# Patient Record
Sex: FEMALE | Race: WHITE
[De-identification: names, ages, dates, MRNs, and addresses within clinical notes are randomized per-mention and may not be internally consistent; named-entity substitution may affect disease eponyms.]

---

## 2017-08-08 ENCOUNTER — HOSPITAL ENCOUNTER (EMERGENCY)
Dept: HOSPITAL 41 - JD.ED | Age: 82
Discharge: HOME | End: 2017-08-08
Payer: MEDICARE

## 2017-08-08 VITALS — SYSTOLIC BLOOD PRESSURE: 153 MMHG | DIASTOLIC BLOOD PRESSURE: 59 MMHG

## 2017-08-08 DIAGNOSIS — Z95.1: ICD-10-CM

## 2017-08-08 DIAGNOSIS — K21.9: ICD-10-CM

## 2017-08-08 DIAGNOSIS — Z79.899: ICD-10-CM

## 2017-08-08 DIAGNOSIS — Z88.6: ICD-10-CM

## 2017-08-08 DIAGNOSIS — E66.9: ICD-10-CM

## 2017-08-08 DIAGNOSIS — Z79.02: ICD-10-CM

## 2017-08-08 DIAGNOSIS — I25.2: Primary | ICD-10-CM

## 2017-08-08 DIAGNOSIS — Z79.4: ICD-10-CM

## 2017-08-08 DIAGNOSIS — E78.00: ICD-10-CM

## 2017-08-08 DIAGNOSIS — E11.9: ICD-10-CM

## 2017-08-08 DIAGNOSIS — M81.0: ICD-10-CM

## 2017-08-08 DIAGNOSIS — M19.90: ICD-10-CM

## 2017-08-08 DIAGNOSIS — F32.9: ICD-10-CM

## 2017-08-08 NOTE — EDM.PDOC
ED HPI GENERAL MEDICAL PROBLEM





- General


Chief Complaint: Cardiovascular Problem


Stated Complaint: HIGH BP


Time Seen by Provider: 08/08/17 13:12


Source of Information: Reports: Patient


History Limitations: Reports: No Limitations





- History of Present Illness


INITIAL COMMENTS - FREE TEXT/NARRATIVE: 





88-year-old female [place] due to discovery of marked 

elevation of blood pressure. Patient reports that her blood pressure was 

checked 3 times in a row on the same arm and each time it seemed to go higher. 

Apparently the systolic pressure was greater than 210 and the diastolic was 

greater than 112.  feels fine without a headache visual acuity changes or 

nausea vomiting etc. She is in no pain. Initial blood pressure here is 153/59.


Onset: Today


Onset Date: 08/08/17


Onset Time: 11:00


Duration: Minutes:, Resolved Prior to Arrival


Severity: Moderate


Improves with: Reports: None


Worsens with: Reports: None (Has chronic hypertension.)


Context: Denies: Activity, Exercise, Lifting, Sick Contact, Trauma, Other


Associated Symptoms: Reports: No Other Symptoms


Treatments PTA: Reports: Other (see below)





- Related Data


 Allergies











Allergy/AdvReac Type Severity Reaction Status Date / Time


 


meperidine HCl [From Demerol] Allergy  unknown Verified 08/08/17 13:17











Home Meds: 


 Home Meds





Carvedilol 2 PO DAILY 04/01/14 [History]


Citalopram [Citalopram Hbr] 20 mg PO DAILY 04/01/14 [History]


Clopidogrel [Plavix] 75 mg PO DAILY 04/01/14 [History]


Furosemide [Lasix] 20 mg PO DAILY 04/01/14 [History]


Insulin Aspart [NovoLOG]  SUBCUT 04/01/14 [History]


Insulin Glarg,Human.Rec.Analog [Lantus] 16 units SQ QPM 04/01/14 [History]


Insulin Glarg,Human.Rec.Analog [Lantus] 38 units SQ QAM 04/01/14 [History]


Levothyroxine 25 mcg PO DAILY 04/01/14 [History]


Lisinopril [Zestril] 20 mg PO Q12HR 04/01/14 [History]


NIFEdipine [Nifedipine ER] 30 mg 04/01/14 [History]


Omeprazole 40 mg PO DAILY 04/01/14 [History]


atorvaSTATin [Lipitor] 20 mg PO DAILY 04/01/14 [History]


hydrOXYzine HCl [hydrOXYzine] 12.5 - 25 mg PO ASDIRECTED 04/01/14 [History]











Past Medical History


Cardiovascular History: Reports: Bypass (5 bypass grafts in the past.), High 

Cholesterol, Hypertension, MI, Stents


Gastrointestinal History: Reports: GERD


Musculoskeletal History: Reports: Back Pain, Chronic, Osteoarthritis, 

Osteoporosis


Neurological History: Reports: Other (See Below) (Chronic insomnia.)


Psychiatric History: Reports: Depression


Endocrine/Metabolic History: Reports: Diabetes, Type II (Controlled with 

insulin and patient reports diabetic control leaves a lot to be desired.), 

Obesity/BMI 30+





Social & Family History





- Recreational Drug Use


Recreational Drug Use: No





- Living Situation & Occupation


Living situation: Reports: , Extended Care Facility (Currently living at 

[facility].)


Occupation: Retired





ED ROS GENERAL





- Review of Systems


Review Of Systems: See Below


Constitutional: Reports: Fatigue.  Denies: Fever, Chills, Malaise, Weakness


HEENT: Reports: Glasses


Respiratory: Reports: Shortness of Breath (Shortness of breath on exertion.)


Cardiovascular: Reports: Blood Pressure Problem (Chronic hypertension), Dyspnea 

on Exertion, Edema (Chronic mild edema in both lower extremities she wears JEAN-PIERRE 

stockings daily.)


Endocrine: Reports: Fatigue, High Glucose (Has known type 2 diabetes which she 

reports is not all that well controlled.)


GI/Abdominal: Reports: Constipation.  Denies: Abdominal Pain, Anorexia, 

Decreased Appetite


: Reports: Frequency, Incontinence (Urge and stress component)


Musculoskeletal: Reports: Neck Pain, Back Pain, Joint Pain (Knees and hips. 

Walks with aid of a walker.)


Skin: Reports: No Symptoms


Neurological: Reports: No Symptoms


Psychiatric: Reports: No Symptoms


Hematologic/Lymphatic: Reports: No Symptoms


Immunologic: Reports: No Symptoms





ED EXAM, GENERAL





- Physical Exam


Exam: See Below


Exam Limited By: No Limitations


General Appearance: Alert, WD/WN, No Apparent Distress


Eye Exam: Bilateral Eye: Normal Fundi, Normal Inspection


Throat/Mouth: Normal Inspection, Normal Oropharynx


Head: Atraumatic, Normocephalic


Neck: Normal Inspection, Supple, Non-Tender, Full Range of Motion.  No: Carotid 

Bruit, Lymphadenopathy (R)


Respiratory/Chest: No Respiratory Distress, Lungs Clear, Normal Breath Sounds, 

No Accessory Muscle Use, Other (Well-healed sternotomy incision. She's had 5 

bypasses in the past.)


Cardiovascular: Regular Rate, Rhythm, No Gallop, No Murmur, No Rub.  No: Normal 

Peripheral Pulses, No Edema


Peripheral Pulses: 1+: Posterior Tibial (L), Posterior Tibial (R), Dorsalis 

Pedis (L), Dorsalis Pedis (R)


GI/Abdominal: Normal Bowel Sounds, Soft, Non-Tender, No Distention, Other (

Abdominal girth limits ability to palpate solid organs.)


Back Exam: Normal Inspection, Full Range of Motion.  No: CVA Tenderness (L), 

CVA Tenderness (R)


Extremities: Pedal Edema (Trace edema both lower extremities at ankles.)


Neurological: Alert, Oriented, CN II-XII Intact, Normal Cognition


Psychiatric: Normal Affect, Normal Mood


Skin Exam: Warm, Dry, Intact, Normal Color, No Rash





Course





- Vital Signs


Last Recorded V/S: 


 Last Vital Signs











Temp  36.1 C   08/08/17 13:13


 


Pulse  61   08/08/17 13:13


 


Resp  18   08/08/17 13:13


 


BP  153/59 H  08/08/17 13:13


 


Pulse Ox  94 L  08/08/17 13:13














- Orders/Labs/Meds


Orders: 


 Active Orders 24 hr











 Category Date Time Status


 


 Orthostatic Vital Signs [RC] ASDIRECTED Care  08/08/17 13:23 Active














- Radiology Interpretation


Free Text/Narrative:: 





88-year-old female presents the ED for evaluation of reported marked elevation 

her blood pressure. EP was reported to be greater than 210 systolically greater 

than 112 diastolically. Apparently blood pressure was repeated in the same arm 

on 3 occasions and each time it went higher. Therefore she was advised to come 

to the emergency room for evaluation. However she is asymptomatic. And blood 

pressure here initially was 153/59 and for the most part is staying between 135 

and 150 systolically. At the time of discharge was 148 on 44 with a heart rate 

of 62. She was monitored in the ER for approximately an hour. No tests were 

done. She will be discharged back to home with no changes to medications to be 

made.





Departure





- Departure


Time of Disposition: 13:55


Disposition: Home, Self-Care 01


Condition: Fair


Clinical Impression: 


 Labile essential hypertension





Referrals: 


Kade Cm MD [Primary Care Provider] - 


Forms:  ED Department Discharge


Additional Instructions: 


Evaluation in the emergency room today in regards to reported very high blood 

pressure with a systolic her top number greater than 210 and the diastolic 

number reported to be greater than 112. Blood pressure initially here was 153 

over 59 and came down as low as 143/52. Monitoring revealed blood pressure to 

be adequately controlled. Therefore at this time no changes in medications are 

advised. Suggest blood pressure checks on a random basis to see if there is 

marked lability which means roller coaster up and down changes to blood 

pressure that may make us you some blood pressure medications in the morning 

and some in the evening to try and prevent pressure spikes. Follow-up with her 

personal physician as planned.





- My Orders


Last 24 Hours: 


My Active Orders





08/08/17 13:23


Orthostatic Vital Signs [RC] ASDIRECTED 














- Assessment/Plan


Last 24 Hours: 


My Active Orders





08/08/17 13:23


Orthostatic Vital Signs [RC] ASDIRECTED

## 2018-06-19 ENCOUNTER — HOSPITAL ENCOUNTER (EMERGENCY)
Dept: HOSPITAL 41 - JD.ED | Age: 83
Discharge: HOME | End: 2018-06-19
Payer: MEDICARE

## 2018-06-19 VITALS — DIASTOLIC BLOOD PRESSURE: 77 MMHG | SYSTOLIC BLOOD PRESSURE: 210 MMHG

## 2018-06-19 DIAGNOSIS — I10: Primary | ICD-10-CM

## 2018-06-19 DIAGNOSIS — Z79.84: ICD-10-CM

## 2018-06-19 DIAGNOSIS — F32.9: ICD-10-CM

## 2018-06-19 DIAGNOSIS — E78.5: ICD-10-CM

## 2018-06-19 DIAGNOSIS — Z79.4: ICD-10-CM

## 2018-06-19 DIAGNOSIS — Z88.5: ICD-10-CM

## 2018-06-19 DIAGNOSIS — E11.9: ICD-10-CM

## 2018-06-19 PROCEDURE — 84484 ASSAY OF TROPONIN QUANT: CPT

## 2018-06-19 PROCEDURE — 70450 CT HEAD/BRAIN W/O DYE: CPT

## 2018-06-19 PROCEDURE — 96360 HYDRATION IV INFUSION INIT: CPT

## 2018-06-19 PROCEDURE — 84443 ASSAY THYROID STIM HORMONE: CPT

## 2018-06-19 PROCEDURE — 81001 URINALYSIS AUTO W/SCOPE: CPT

## 2018-06-19 PROCEDURE — 93005 ELECTROCARDIOGRAM TRACING: CPT

## 2018-06-19 PROCEDURE — 85007 BL SMEAR W/DIFF WBC COUNT: CPT

## 2018-06-19 PROCEDURE — 85027 COMPLETE CBC AUTOMATED: CPT

## 2018-06-19 PROCEDURE — 96361 HYDRATE IV INFUSION ADD-ON: CPT

## 2018-06-19 PROCEDURE — 36415 COLL VENOUS BLD VENIPUNCTURE: CPT

## 2018-06-19 PROCEDURE — 99285 EMERGENCY DEPT VISIT HI MDM: CPT

## 2018-06-19 PROCEDURE — 82962 GLUCOSE BLOOD TEST: CPT

## 2018-06-19 PROCEDURE — 80053 COMPREHEN METABOLIC PANEL: CPT

## 2018-06-19 NOTE — EDM.PDOC
ED HPI GENERAL MEDICAL PROBLEM





- General


Chief Complaint: Syncope


Stated Complaint: DIZZYNESS


Time Seen by Provider: 06/19/18 16:05


Source of Information: Reports: Patient


History Limitations: Reports: No Limitations





- History of Present Illness


INITIAL COMMENTS - FREE TEXT/NARRATIVE: 





Patient is complaining dizziness that started at around 1400hrs.  Patient was 

in the bathroom and felt dizzy and had to catch herself from falling. Patient 

dened having a BM and was no pushing hard with urinating.  Patient states 

movement does worsen the dizziness. She has history of vertigo.  States 

symptoms were similar to previous episodes. Currently has no symptoms.  

Daughter states after the episode she did harry lean to the left and was able 

to walk with assistance. Patient has a history of stroke right side with no 

deficits.  She presents to the E.D. with elevated BP.  Patient and daughter 

state this is a normal reading for the patient. She denies symptoms experienced 

today were similar to previous stroke symptoms.  She denies headache, vision 

changes, stiff neck, no sitting tingling, dysuria, chest pain, shortness of 

breath, fever, increased weight, increased swelling to lower extremities, 

swallowing issues, and or any additional complaints. 





Past medical history: PID, hypertension, depression, type 2 diabetes, 

hyperlipidemia, coronary disease, edema, chronic pain, osteoarthritis, 

hypothyroidism, encephalopathy, TIA, subdural hematoma/hemorrhage, venous 

insufficiency





Current medications include: see list. 





- Related Data


 Allergies











Allergy/AdvReac Type Severity Reaction Status Date / Time


 


meperidine HCl [From Demerol] Allergy  Vomiting Verified 06/19/18 15:56


 


metformin Allergy  Other Verified 06/19/18 15:57


 


morphine Allergy  Vomiting Verified 06/19/18 15:57











Home Meds: 


 Home Meds





Carvedilol 25 mg PO DAILY 04/01/14 [History]


Citalopram [Citalopram Hbr] 20 mg PO DAILY 04/01/14 [History]


Clopidogrel [Plavix] 75 mg PO DAILY 04/01/14 [History]


Furosemide [Lasix] 20 mg PO DAILY 04/01/14 [History]


Insulin Aspart [NovoLOG] 10 units SUBCUT ASDIRECTED 04/01/14 [History]


Insulin Glarg,Human.Rec.Analog [Lantus] 16 units SQ QPM 04/01/14 [History]


Insulin Glarg,Human.Rec.Analog [Lantus] 38 units SQ QAM 04/01/14 [History]


Levothyroxine 25 mcg PO DAILY 04/01/14 [History]


Lisinopril [Zestril] 20 mg PO Q12HR 04/01/14 [History]


NIFEdipine [Nifedipine ER] 30 mg PO DAILY 04/01/14 [History]


Omeprazole 40 mg PO DAILY 04/01/14 [History]


atorvaSTATin [Lipitor] 20 mg PO DAILY 04/01/14 [History]


hydrOXYzine HCl [hydrOXYzine] 12.5 - 25 mg PO ASDIRECTED 04/01/14 [History]











Past Medical History


Cardiovascular History: Reports: Bypass, High Cholesterol, Hypertension, MI, 

Stents


Gastrointestinal History: Reports: GERD


OB/GYN History: Reports: Pregnancy


Musculoskeletal History: Reports: Back Pain, Chronic, Osteoarthritis, 

Osteoporosis


Neurological History: Reports: Other (See Below)


Other Neuro History: has had brain surgery following fall to remove accumulated 

blood.


Psychiatric History: Reports: Depression


Endocrine/Metabolic History: Reports: Diabetes, Type II, Obesity/BMI 30+





- Past Surgical History


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





Social & Family History





- Family History


Family Medical History: Noncontributory





- Tobacco Use


Smoking Status *Q: Former Smoker


Used Tobacco, but Quit: Yes


Month/Year Tobacco Last Used: 1975





- Caffeine Use


Caffeine Use: Reports: Coffee





- Recreational Drug Use


Recreational Drug Use: No





- Living Situation & Occupation


Living situation: Reports: , Extended Care Facility (Currently living at 

[facility].)


Occupation: Retired





ED ROS GENERAL





- Review of Systems


Review Of Systems: See Below


Constitutional: Reports: No Symptoms


HEENT: Reports: No Symptoms


Respiratory: Reports: No Symptoms


Cardiovascular: Reports: No Symptoms


Endocrine: Reports: No Symptoms


GI/Abdominal: Reports: No Symptoms


: Reports: No Symptoms


Musculoskeletal: Reports: No Symptoms


Neurological: Reports: Dizziness, Headache, Gait Disturbance.  Denies: Numbness

, Pre-Existing Deficit, Seizure, Syncope, Tingling, Weakness





ED EXAM, DIZZINESS





- Physical Exam


Exam: See Below


Exam Limited By: No Limitations


General Appearance: Alert, WD/WN, No Apparent Distress


Eye Exam: Bilateral Eye: EOMI, Nystagmus (none noted), PERRL


Nystagmus: No: worsens with head to L, worsens with head to R, reproducible, 

reversible, constant, short duration


Ears: Normal External Exam, Normal Canal, Hearing Grossly Normal, Normal TMs


Nose: Normal Inspection


Throat/Mouth: Normal Inspection, Normal Oropharynx, Normal Voice


Head Exam: Atraumatic, Normocephalic


Vertigo: worsens with head to L.  No: worsens with head to R, reproducible, 

reversible, constant, short duration


Neck: Normal Inspection, Supple, Non-Tender.  No: Carotid Bruit


Respiratory/Chest: No Respiratory Distress, Lungs Clear, Normal Breath Sounds, 

No Accessory Muscle Use, Chest Non-Tender


Cardiovascular: Normal Peripheral Pulses, Regular Rate, Rhythm, No Murmur


GI/Abdominal: Normal Bowel Sounds, Soft, Non-Tender, No Distention


Neurological: Alert, Normal Mood/Affect, Normal Dorsiflexion, CN II-XII Intact, 

Normal Plantar Flexion, No Motor/Sensory Deficits, Oriented x 3, Other (No 

facial droop, slurred speech, tongue deviation, pronator drift, and or 

nystagmus.  No weakness discrepancies to the upper/lower extremities. 

Cerebellar fx intact: finger to nose, rapid alternating movements, and heal to 

shin.  )


Extremities: Normal Inspection, Normal Range of Motion, Non-Tender, Normal 

Capillary Refill


Psychiatric: Normal Affect, Normal Mood


Skin Exam: Warm, Dry, Intact, Normal Color, No Rash





Course





- Vital Signs


Last Recorded V/S: 


 Last Vital Signs











Temp  97.2 F   06/19/18 15:51


 


Pulse  63   06/19/18 15:51


 


Resp  18   06/19/18 15:51


 


BP  210/77 H  06/19/18 18:23


 


Pulse Ox  95   06/19/18 15:51








 





Orthostatic Blood Pressure [     197/76


Standing]                        


Orthostatic Blood Pressure [     208/73


Sitting]                         


Orthostatic Blood Pressure [     192/69


Supine]                          











- Orders/Labs/Meds


Labs: 


 Laboratory Tests











  06/19/18 06/19/18 06/19/18 Range/Units





  16:52 16:55 16:55 


 


WBC   6.82   (3.98-10.04)  K/mm3


 


RBC   4.74   (3.98-5.22)  M/mm3


 


Hgb   11.9   (11.2-15.7)  gm/L


 


Hct   39.1   (34.1-44.9)  %


 


MCV   82.5   (79.4-94.8)  fl


 


MCH   25.1 L   (25.6-32.2)  pg


 


MCHC   30.4 L   (32.2-35.5)  g/dl


 


RDW Std Deviation   44.1   (36.4-46.3)  fL


 


Plt Count   214   (182-369)  K/mm3


 


MPV   10.8   (9.4-12.3)  fl


 


Neutrophils % (Manual)   53   (40-60)  %


 


Band Neutrophils %   0   (0-10)  %


 


Lymphocytes % (Manual)   37   (20-40)  %


 


Atypical Lymphs %   6   %


 


Monocytes % (Manual)   1 L   (2-10)  %


 


Eosinophils % (Manual)   3   (0.7-5.8)  %


 


Basophils % (Manual)   0 L   (0.1-1.2)  


 


Platelet Estimate   Adequate   


 


Plt Morphology Comment   Normal   


 


RBC Morph Comment   Normal   


 


Sodium    138  (136-145)  mEq/L


 


Potassium    3.6  (3.5-5.1)  mEq/L


 


Chloride    101  ()  mEq/L


 


Carbon Dioxide    29  (21-32)  mEq/L


 


Anion Gap    11.6  (5-15)  


 


BUN    19 H  (7-18)  mg/dL


 


Creatinine    0.9  (0.55-1.02)  mg/dL


 


Est Cr Clr Drug Dosing    38.88  mL/min


 


Estimated GFR (MDRD)    59  (>60)  mL/min


 


BUN/Creatinine Ratio    21.1 H  (14-18)  


 


Glucose    213 H  ()  mg/dL


 


POC Glucose     ()  mg/dL


 


Calcium    8.8  (8.5-10.1)  mg/dL


 


Total Bilirubin    0.3  (0.2-1.0)  mg/dL


 


AST    24  (15-37)  U/L


 


ALT    29  (14-59)  U/L


 


Alkaline Phosphatase    95  ()  U/L


 


Troponin I    < 0.017  (0.00-0.056)  ng/mL


 


Total Protein    7.3  (6.4-8.2)  g/dl


 


Albumin    3.4  (3.4-5.0)  g/dl


 


Globulin    3.9  gm/dL


 


Albumin/Globulin Ratio    0.9 L  (1-2)  


 


TSH 3rd Generation    1.603  (0.358-3.74)  uIU/mL


 


Urine Color  Yellow    (Yellow)  


 


Urine Appearance  Slt cloudy H    (Clear)  


 


Urine pH  6.0    (5.0-8.0)  


 


Ur Specific Gravity  > or = 1.030    (1.005-1.030)  


 


Urine Protein  1+ H    (Negative)  


 


Urine Glucose (UA)  Negative    (Negative)  


 


Urine Ketones  Trace H    (Negative)  


 


Urine Occult Blood  Negative    (Negative)  


 


Urine Nitrite  Negative    (Negative)  


 


Urine Bilirubin  1+ H    (Negative)  


 


Urine Urobilinogen  1.0    (0.2-1.0)  


 


Ur Leukocyte Esterase  Negative    (Negative)  


 


Urine RBC  0-5    (0-5)  /hpf


 


Urine WBC  0-5    (0-5)  /hpf


 


Ur Epithelial Cells  0-5    (0-5)  /hpf


 


Urine Bacteria  Moderate H    (FEW)  /hpf


 


Urine Mucus  Not seen    (FEW)  /hpf














  06/19/18 Range/Units





  18:04 


 


WBC   (3.98-10.04)  K/mm3


 


RBC   (3.98-5.22)  M/mm3


 


Hgb   (11.2-15.7)  gm/L


 


Hct   (34.1-44.9)  %


 


MCV   (79.4-94.8)  fl


 


MCH   (25.6-32.2)  pg


 


MCHC   (32.2-35.5)  g/dl


 


RDW Std Deviation   (36.4-46.3)  fL


 


Plt Count   (182-369)  K/mm3


 


MPV   (9.4-12.3)  fl


 


Neutrophils % (Manual)   (40-60)  %


 


Band Neutrophils %   (0-10)  %


 


Lymphocytes % (Manual)   (20-40)  %


 


Atypical Lymphs %   %


 


Monocytes % (Manual)   (2-10)  %


 


Eosinophils % (Manual)   (0.7-5.8)  %


 


Basophils % (Manual)   (0.1-1.2)  


 


Platelet Estimate   


 


Plt Morphology Comment   


 


RBC Morph Comment   


 


Sodium   (136-145)  mEq/L


 


Potassium   (3.5-5.1)  mEq/L


 


Chloride   ()  mEq/L


 


Carbon Dioxide   (21-32)  mEq/L


 


Anion Gap   (5-15)  


 


BUN   (7-18)  mg/dL


 


Creatinine   (0.55-1.02)  mg/dL


 


Est Cr Clr Drug Dosing   mL/min


 


Estimated GFR (MDRD)   (>60)  mL/min


 


BUN/Creatinine Ratio   (14-18)  


 


Glucose   ()  mg/dL


 


POC Glucose  207 H  ()  mg/dL


 


Calcium   (8.5-10.1)  mg/dL


 


Total Bilirubin   (0.2-1.0)  mg/dL


 


AST   (15-37)  U/L


 


ALT   (14-59)  U/L


 


Alkaline Phosphatase   ()  U/L


 


Troponin I   (0.00-0.056)  ng/mL


 


Total Protein   (6.4-8.2)  g/dl


 


Albumin   (3.4-5.0)  g/dl


 


Globulin   gm/dL


 


Albumin/Globulin Ratio   (1-2)  


 


TSH 3rd Generation   (0.358-3.74)  uIU/mL


 


Urine Color   (Yellow)  


 


Urine Appearance   (Clear)  


 


Urine pH   (5.0-8.0)  


 


Ur Specific Gravity   (1.005-1.030)  


 


Urine Protein   (Negative)  


 


Urine Glucose (UA)   (Negative)  


 


Urine Ketones   (Negative)  


 


Urine Occult Blood   (Negative)  


 


Urine Nitrite   (Negative)  


 


Urine Bilirubin   (Negative)  


 


Urine Urobilinogen   (0.2-1.0)  


 


Ur Leukocyte Esterase   (Negative)  


 


Urine RBC   (0-5)  /hpf


 


Urine WBC   (0-5)  /hpf


 


Ur Epithelial Cells   (0-5)  /hpf


 


Urine Bacteria   (FEW)  /hpf


 


Urine Mucus   (FEW)  /hpf











Meds: 


Medications














Discontinued Medications














Generic Name Dose Route Start Last Admin





  Trade Name Freq  PRN Reason Stop Dose Admin


 


Clonidine HCl  0.2 mg  06/19/18 18:12  06/19/18 18:23





  Catapres  PO  06/19/18 18:13  0.2 mg





  ONETIME ONE   Administration





     





     





     





     


 


Sodium Chloride  1,000 mls @ 150 mls/hr  06/19/18 17:00  06/19/18 17:01





  Normal Saline  IV   150 mls/hr





  ASDIRECTED ELMA   Administration





     





     





     





     


 


Meclizine HCl  12.5 mg  06/19/18 16:51  06/19/18 16:59





  Antivert  PO  06/19/18 16:52  12.5 mg





  ONETIME ONE   Administration





     





     





     





     


 


Sodium Chloride  10 ml  06/19/18 16:51  06/19/18 16:59





  Saline Flush  FLUSH   10 ml





  ASDIRECTED PRN   Administration





  Keep Vein Open   





     





     





     














- Re-Assessments/Exams


Free Text/Narrative Re-Assessment/Exam: 








IV established with with initial blood work including: CBC, chem 14, troponin, 

TSH, UA, orthostatic vital signs, and EKG. CT of the head without contrast 

obtained. 





Ordered meclizine 12.5mg po and NS 150mls/hr.





Per nursing staff patient did get up to walk with no difficulties. No gate 

abnormalities noted. 





EKG: Sinus rhythm at a rate of 56 with  and TX interval 188. Q waves 

anterior lateral leads with mild ST elevation beats V2. T wave inversion in 

leads 1 and V4 through 6.





Blood pressure continues to run high.  Last reading 214/84. Will await for CT 

of the head prior to initiating any treatment. 





CT head w/o contrast: No acute intracranial abnormalities noted. Reviewed with 

Dr. Miranda. 





1955 /93 right, 229/95 left.  Discussed patient with Dr. Miranda and he 

agreed with administration of clonidine. Suggested higher dose of 0.2mg PO. 





Labs reviewed: CBC essentially normal. CMP revealed a slightly elevated BUN and 

glucose of 213. Troponin within normal limits. TSH normal.





UA protein one plus, trace ketones, bilirubin 1+, bacteria moderate. Urine wbc's

, nitrates, leukocyte Estrace all negative.





1912 blood pressure 195/68.





1941 /71. 





06/19/18 19:51 172/52.  





Patient did get up and ambulate with no difficulties. Still to do not have 

clear indication why patients BP was elevated. Symptoms came on at noon today 

after eating. Thus troponin should have been positive if heart related.She has 

had no cp, sob, palpations, and or pre/syncopal episodes. Dizziness came about 

with body position changes.  Described as room spinning. This has since 

resolved. Do not think patient had a TIA with no neurological findings on 

examination. I do not think CTA of the head and neck would be beneficial at 

this time. I will have patient see her PCP on followup this week. I discussed 

the concerning symptoms for which to return to the E.D. with the patient/

family. The patient/family verbalized understanding. All questions were 

answered. 








Departure





- Departure


Time of Disposition: 20:01


Disposition: Home, Self-Care 01


Condition: Good


Clinical Impression: 


Hypertension


Qualifiers:


 Hypertension type: unspecified Qualified Code(s): I10 - Essential (primary) 

hypertension








- Discharge Information


Instructions:  How to Take Your Blood Pressure, Easy-to-Read, DASH Eating Plan, 

Hypertension, Easy-to-Read, Managing Your Hypertension, Preventing 

Cerebrovascular Disease


Referrals: 


Kade Cm MD [Primary Care Provider] - 


Forms:  ED Department Discharge


Additional Instructions: 








Do not have clear reason for why blood pressure was elevated. Suspect dizziness 

was related to the elevated BP.  Please followup with PCP this week for 

reevaluation. Return to the E.D. if you develop any new or worsening symptoms 

as discussed. 





Check your blood pressure every other day at different times during the day. 

Allow yourself approximately 15 minutes to relax prior to taking. Do not take 

if in pain, with increased anxiety, or just had exercised.  Keep a log with the 

blood pressure readings. Take your blood pressure machine and log with you to 

your next appointment with PCP to ensure blood pressure machine is calibrated 

appropriately.

## 2018-06-20 NOTE — CT
Head CT

 

Technique: Multiple axial sections through the brain were obtained.  

Intravenous contrast was not utilized.

 

Comparison: Prior head CT exam of 10/29/15.

 

Findings: Ventricles along with basal cisterns and sulci over the 

convexities are moderately prominent.  Diminished density is noted 

within the periventricular and subcortical white matter which is felt 

compatible with small vessel ischemic demyelination change.  Old 

lacunar infarcts are also noted within the basal ganglia.  No other 

abnormal parenchymal densities are seen.  No evidence of intracranial 

hemorrhage.  No midline shift or mass effect is seen.

 

Bone window settings show mucosal thickening within the right side of 

the sphenoid sinus which is an interval change from previous exam.  

Atherosclerotic calcification is noted within the carotid siphon.  No 

acute calvarial abnormality is seen.  Stable jessie holes are 

identified.

 

Impression:

1.  Senescent changes as described above.  No acute intracranial 

abnormality is appreciated.  No significant change is seen from prior 

study other than mild chronic appearing sinus disease within the right

 sphenoid sinus.

 

Diagnostic code #2

 

I agree with preliminary report from [facility], finalized at 06/19/18, 6:54

 PM Central Time

## 2019-03-24 ENCOUNTER — HOSPITAL ENCOUNTER (INPATIENT)
Dept: HOSPITAL 41 - JD.ED | Age: 84
LOS: 2 days | Discharge: HOME | DRG: 69 | End: 2019-03-26
Payer: MEDICARE

## 2019-03-24 DIAGNOSIS — I25.2: ICD-10-CM

## 2019-03-24 DIAGNOSIS — I25.10: ICD-10-CM

## 2019-03-24 DIAGNOSIS — K21.9: ICD-10-CM

## 2019-03-24 DIAGNOSIS — R51: ICD-10-CM

## 2019-03-24 DIAGNOSIS — F32.9: ICD-10-CM

## 2019-03-24 DIAGNOSIS — E78.00: ICD-10-CM

## 2019-03-24 DIAGNOSIS — Z79.4: ICD-10-CM

## 2019-03-24 DIAGNOSIS — M54.9: ICD-10-CM

## 2019-03-24 DIAGNOSIS — M81.0: ICD-10-CM

## 2019-03-24 DIAGNOSIS — Z95.1: ICD-10-CM

## 2019-03-24 DIAGNOSIS — Z79.899: ICD-10-CM

## 2019-03-24 DIAGNOSIS — I65.29: ICD-10-CM

## 2019-03-24 DIAGNOSIS — H53.8: ICD-10-CM

## 2019-03-24 DIAGNOSIS — Z95.5: ICD-10-CM

## 2019-03-24 DIAGNOSIS — G45.9: Primary | ICD-10-CM

## 2019-03-24 DIAGNOSIS — G89.29: ICD-10-CM

## 2019-03-24 DIAGNOSIS — Z88.8: ICD-10-CM

## 2019-03-24 DIAGNOSIS — Z79.02: ICD-10-CM

## 2019-03-24 DIAGNOSIS — I10: ICD-10-CM

## 2019-03-24 DIAGNOSIS — M19.90: ICD-10-CM

## 2019-03-24 DIAGNOSIS — D69.6: ICD-10-CM

## 2019-03-24 DIAGNOSIS — E66.9: ICD-10-CM

## 2019-03-24 DIAGNOSIS — R41.82: ICD-10-CM

## 2019-03-24 DIAGNOSIS — R00.1: ICD-10-CM

## 2019-03-24 DIAGNOSIS — Z88.5: ICD-10-CM

## 2019-03-24 DIAGNOSIS — Z66: ICD-10-CM

## 2019-03-24 DIAGNOSIS — E11.9: ICD-10-CM

## 2019-03-24 DIAGNOSIS — E78.5: ICD-10-CM

## 2019-03-24 DIAGNOSIS — Z79.890: ICD-10-CM

## 2019-03-24 PROCEDURE — 85652 RBC SED RATE AUTOMATED: CPT

## 2019-03-24 PROCEDURE — 84484 ASSAY OF TROPONIN QUANT: CPT

## 2019-03-24 PROCEDURE — 99285 EMERGENCY DEPT VISIT HI MDM: CPT

## 2019-03-24 PROCEDURE — 82962 GLUCOSE BLOOD TEST: CPT

## 2019-03-24 PROCEDURE — 85025 COMPLETE CBC W/AUTO DIFF WBC: CPT

## 2019-03-24 PROCEDURE — 80053 COMPREHEN METABOLIC PANEL: CPT

## 2019-03-24 PROCEDURE — 81001 URINALYSIS AUTO W/SCOPE: CPT

## 2019-03-24 PROCEDURE — 93005 ELECTROCARDIOGRAM TRACING: CPT

## 2019-03-24 PROCEDURE — 85610 PROTHROMBIN TIME: CPT

## 2019-03-24 PROCEDURE — 96374 THER/PROPH/DIAG INJ IV PUSH: CPT

## 2019-03-24 PROCEDURE — 70450 CT HEAD/BRAIN W/O DYE: CPT

## 2019-03-24 PROCEDURE — 36415 COLL VENOUS BLD VENIPUNCTURE: CPT

## 2019-03-24 PROCEDURE — 85730 THROMBOPLASTIN TIME PARTIAL: CPT

## 2019-03-24 RX ADMIN — NYSTATIN SCH: 100000 CREAM TOPICAL at 21:28

## 2019-03-24 NOTE — EDM.PDOC
ED HPI GENERAL MEDICAL PROBLEM





- General


Chief Complaint: Neuro Symptoms/Deficits


Stated Complaint: SPEECH PROBLEMS,


Time Seen by Provider: 03/24/19 13:48


Source of Information: Reports: Patient, Family


History Limitations: Reports: No Limitations





- History of Present Illness


INITIAL COMMENTS - FREE TEXT/NARRATIVE: 





The patient presents from [facility] for a possible stroke.  The patient's 

last known well was 12pm or noon today.  She came down for lunch and her 

daughter joined her after and she noticed something was not right.  The patient 

could not speak and was mixing words up.  She could walk and move her arms.  

She did say she had a headache.  When she arrived here, she could not speak.  

She was able to stand up from the wheel chair and sit on the cot and she moved 

her arms.  She could not answer any questions for me.  She could follow 

commands.


Onset: Sudden


Duration: Hour(s): (noon)


Location: Reports: Head


Severity: Moderate


Improves with: Reports: None


Worsens with: Reports: None


Associated Symptoms: Reports: Headaches.  Denies: Cough, Fever/Chills, Nausea/

Vomiting, Shortness of Breath





- Related Data


 Allergies











Allergy/AdvReac Type Severity Reaction Status Date / Time


 


meperidine HCl [From Demerol] Allergy  Vomiting Verified 06/19/18 15:56


 


metformin Allergy  Other Verified 06/19/18 15:57


 


morphine Allergy  Vomiting Verified 06/19/18 15:57











Home Meds: 


 Home Meds





Carvedilol 25 mg PO BID 04/01/14 [History]


Citalopram [Citalopram Hbr] 10 mg PO DAILY 04/01/14 [History]


Clopidogrel [Plavix] 75 mg PO DAILY 04/01/14 [History]


Furosemide [Lasix] 20 mg PO DAILY 04/01/14 [History]


Insulin Aspart [NovoLOG] 10 units SUBCUT ASDIRECTED 04/01/14 [History]


Insulin Glarg,Human.Rec.Analog [Lantus] 16 units SQ QPM 04/01/14 [History]


Insulin Glarg,Human.Rec.Analog [Lantus] 38 units SQ QAM 04/01/14 [History]


Levothyroxine 25 mcg PO DAILY 04/01/14 [History]


Lisinopril [Zestril] 20 mg PO Q12HR 04/01/14 [History]


Omeprazole 40 mg PO DAILY 04/01/14 [History]


atorvaSTATin [Lipitor] 20 mg PO BEDTIME 04/01/14 [History]


Acetaminophen [Tylenol] 650 mg PO BID 03/24/19 [History]


Diltiazem [Cardizem CD] 120 mg PO DAILY 03/24/19 [History]


Hydrocortisone [Hydrocortisone 1% Crm] 1 mg TOP BID 03/24/19 [History]


Multivitamin [Multivitamins] 1 each PO DAILY 03/24/19 [History]


Nystatin [Nystatin Crm] 1 gm TOP BID 03/24/19 [History]


Spironolactone [Aldactone] 12.5 mg PO DAILY 03/24/19 [History]











Past Medical History


Cardiovascular History: Reports: Bypass, High Cholesterol, Hypertension, MI, 

Stents


Gastrointestinal History: Reports: GERD


OB/GYN History: Reports: Pregnancy


Musculoskeletal History: Reports: Back Pain, Chronic, Osteoarthritis, 

Osteoporosis


Neurological History: Reports: Other (See Below)


Other Neuro History: has had brain surgery following fall to remove accumulated 

blood.


Psychiatric History: Reports: Depression


Endocrine/Metabolic History: Reports: Diabetes, Type II, Obesity/BMI 30+





- Past Surgical History


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





Social & Family History





- Family History


Family Medical History: Noncontributory





- Tobacco Use


Smoking Status *Q: Never Smoker





- Caffeine Use


Caffeine Use: Reports: Coffee





- Recreational Drug Use


Recreational Drug Use: No





- Living Situation & Occupation


Living situation: Reports: , Extended Care Facility (Currently living at 

[facility].)


Occupation: Retired





ED ROS GENERAL





- Review of Systems


Review Of Systems: Unable To Obtain





ED EXAM, NEURO





- Physical Exam


Exam: See Below


Exam Limited By: Altered Mental Status


General Appearance: Alert, Other (She cannot talk but she will follow commands)


Eye Exam: Left Eye: Vision Changes, Bilateral Eye: EOMI, PERRL


Ears: Normal External Exam


Nose: Normal Inspection


Head Exam: Atraumatic, Normocephalic


Neck: Normal Inspection


Respiratory/Chest: No Respiratory Distress, Lungs Clear, Normal Breath Sounds


Cardiovascular: Regular Rate, Rhythm, No Edema, No Murmur


GI/Abdominal: Soft, Non-Tender, No Organomegaly, No Mass


Neurological: Alert, No Motor/Sensory Deficits, Other (She cannot talk.  She 

follows commands.  She has equal strength in her arms and legs)


Back Exam: Normal Inspection


Extremities: Normal Inspection





EKG INTERPRETATION


EKG Date: 03/24/19


Time: 14:11


Rhythm: Other (sinus bradycardia)


Rate (Beats/Min): 51


Axis: Normal


P-Wave: Present


QRS: Normal


ST-T: Other (Flattened T waves in the lateral leads)


QT: Normal


EKG Interpretation Comments: 





Q waves in the anterior leads





Course





- Vital Signs


Last Recorded V/S: 


 Last Vital Signs











Temp  97.8 F   03/24/19 13:53


 


Pulse      


 


Resp  16   03/24/19 13:53


 


BP      


 


Pulse Ox  98   03/24/19 13:53














- Orders/Labs/Meds


Orders: 


 Active Orders 24 hr











 Category Date Time Status


 


 Cardiac Monitoring [RC] .AS DIRECTED Care  03/24/19 13:53 Active


 


 EKG Documentation Completion [RC] STAT Care  03/24/19 13:54 Active


 


 Oxygen Therapy [RC] PRN Care  03/24/19 13:54 Active


 


 Peripheral IV Care [RC] .AS DIRECTED Care  03/24/19 13:54 Active


 


 Sodium Chloride 0.9% [Saline Flush] Med  03/24/19 13:53 Active





 10 ml FLUSH ASDIRECTED PRN   


 


 Peripheral IV Insertion Adult [OM.PC] Stat Oth  03/24/19 13:53 Ordered








 Medication Orders





Sodium Chloride (Saline Flush)  10 ml FLUSH ASDIRECTED PRN


   PRN Reason: Keep Vein Open


   Last Admin: 03/24/19 13:55  Dose: 10 ml








Labs: 


 Laboratory Tests











  03/24/19 03/24/19 03/24/19 Range/Units





  13:53 13:55 13:55 


 


WBC   7.04   (3.98-10.04)  K/mm3


 


RBC   4.70   (3.98-5.22)  M/mm3


 


Hgb   11.9   (11.2-15.7)  gm/L


 


Hct   38.8   (34.1-44.9)  %


 


MCV   82.6   (79.4-94.8)  fl


 


MCH   25.3 L   (25.6-32.2)  pg


 


MCHC   30.7 L   (32.2-35.5)  g/dl


 


RDW Std Deviation   44.7   (36.4-46.3)  fL


 


Plt Count   115 L   (182-369)  K/mm3


 


MPV   11.6   (9.4-12.3)  fl


 


Neut % (Auto)   58.8   (34.0-71.1)  %


 


Lymph % (Auto)   28.6   (19.3-51.7)  %


 


Mono % (Auto)   9.4   (4.7-12.5)  %


 


Eos % (Auto)   2.8   (0.7-5.8)  


 


Baso % (Auto)   0.4   (0.1-1.2)  %


 


Neut # (Auto)   4.14   (1.56-6.13)  K/mm3


 


Lymph # (Auto)   2.01   (1.18-3.74)  K/mm3


 


Mono # (Auto)   0.66 H   (0.24-0.36)  K/mm3


 


Eos # (Auto)   0.20   (0.04-0.36)  K/mm3


 


Baso # (Auto)   0.03   (0.01-0.08)  K/mm3


 


ESR     (0-20)  mm/hr


 


PT     (9.5-12.1)  SECONDS


 


INR     


 


APTT     (24-31)  SECONDS


 


Sodium    138  (136-145)  mEq/L


 


Potassium    3.6  (3.5-5.1)  mEq/L


 


Chloride    99  ()  mEq/L


 


Carbon Dioxide    30  (21-32)  mEq/L


 


Anion Gap    12.6  (5-15)  


 


BUN    15  (7-18)  mg/dL


 


Creatinine    1.0  (0.55-1.02)  mg/dL


 


Est Cr Clr Drug Dosing    35.70  mL/min


 


Estimated GFR (MDRD)    52  (>60)  mL/min


 


BUN/Creatinine Ratio    15.0  (14-18)  


 


Glucose    97  ()  mg/dL


 


POC Glucose  118 H    ()  mg/dL


 


Calcium    9.0  (8.5-10.1)  mg/dL


 


Total Bilirubin    0.2  (0.2-1.0)  mg/dL


 


AST    24  (15-37)  U/L


 


ALT    29  (14-59)  U/L


 


Alkaline Phosphatase    94  ()  U/L


 


Troponin I    < 0.017  (0.00-0.056)  ng/mL


 


Total Protein    7.4  (6.4-8.2)  g/dl


 


Albumin    3.3 L  (3.4-5.0)  g/dl


 


Globulin    4.1  gm/dL


 


Albumin/Globulin Ratio    0.8 L  (1-2)  


 


Urine Color     (Yellow)  


 


Urine Appearance     (Clear)  


 


Urine pH     (5.0-8.0)  


 


Ur Specific Gravity     (1.005-1.030)  


 


Urine Protein     (Negative)  


 


Urine Glucose (UA)     (Negative)  


 


Urine Ketones     (Negative)  


 


Urine Occult Blood     (Negative)  


 


Urine Nitrite     (Negative)  


 


Urine Bilirubin     (Negative)  


 


Urine Urobilinogen     (0.2-1.0)  


 


Ur Leukocyte Esterase     (Negative)  


 


Urine RBC     (0-5)  /hpf


 


Urine WBC     (0-5)  /hpf


 


Ur Epithelial Cells     


 


Ur Squamous Epith Cells     (0-5)  /hpf


 


Urine Bacteria     (FEW)  /hpf


 


Urine Mucus     (FEW)  /hpf














  03/24/19 03/24/19 03/24/19 Range/Units





  13:55 14:25 14:50 


 


WBC     (3.98-10.04)  K/mm3


 


RBC     (3.98-5.22)  M/mm3


 


Hgb     (11.2-15.7)  gm/L


 


Hct     (34.1-44.9)  %


 


MCV     (79.4-94.8)  fl


 


MCH     (25.6-32.2)  pg


 


MCHC     (32.2-35.5)  g/dl


 


RDW Std Deviation     (36.4-46.3)  fL


 


Plt Count     (182-369)  K/mm3


 


MPV     (9.4-12.3)  fl


 


Neut % (Auto)     (34.0-71.1)  %


 


Lymph % (Auto)     (19.3-51.7)  %


 


Mono % (Auto)     (4.7-12.5)  %


 


Eos % (Auto)     (0.7-5.8)  


 


Baso % (Auto)     (0.1-1.2)  %


 


Neut # (Auto)     (1.56-6.13)  K/mm3


 


Lymph # (Auto)     (1.18-3.74)  K/mm3


 


Mono # (Auto)     (0.24-0.36)  K/mm3


 


Eos # (Auto)     (0.04-0.36)  K/mm3


 


Baso # (Auto)     (0.01-0.08)  K/mm3


 


ESR  35 H    (0-20)  mm/hr


 


PT   11.1   (9.5-12.1)  SECONDS


 


INR   1.02   


 


APTT   28   (24-31)  SECONDS


 


Sodium     (136-145)  mEq/L


 


Potassium     (3.5-5.1)  mEq/L


 


Chloride     ()  mEq/L


 


Carbon Dioxide     (21-32)  mEq/L


 


Anion Gap     (5-15)  


 


BUN     (7-18)  mg/dL


 


Creatinine     (0.55-1.02)  mg/dL


 


Est Cr Clr Drug Dosing     mL/min


 


Estimated GFR (MDRD)     (>60)  mL/min


 


BUN/Creatinine Ratio     (14-18)  


 


Glucose     ()  mg/dL


 


POC Glucose     ()  mg/dL


 


Calcium     (8.5-10.1)  mg/dL


 


Total Bilirubin     (0.2-1.0)  mg/dL


 


AST     (15-37)  U/L


 


ALT     (14-59)  U/L


 


Alkaline Phosphatase     ()  U/L


 


Troponin I     (0.00-0.056)  ng/mL


 


Total Protein     (6.4-8.2)  g/dl


 


Albumin     (3.4-5.0)  g/dl


 


Globulin     gm/dL


 


Albumin/Globulin Ratio     (1-2)  


 


Urine Color    Yellow  (Yellow)  


 


Urine Appearance    Clear  (Clear)  


 


Urine pH    6.0  (5.0-8.0)  


 


Ur Specific Gravity    1.015  (1.005-1.030)  


 


Urine Protein    Negative  (Negative)  


 


Urine Glucose (UA)    Negative  (Negative)  


 


Urine Ketones    Negative  (Negative)  


 


Urine Occult Blood    Negative  (Negative)  


 


Urine Nitrite    Negative  (Negative)  


 


Urine Bilirubin    Negative  (Negative)  


 


Urine Urobilinogen    0.2  (0.2-1.0)  


 


Ur Leukocyte Esterase    Negative  (Negative)  


 


Urine RBC    0-5  (0-5)  /hpf


 


Urine WBC    0-5  (0-5)  /hpf


 


Ur Epithelial Cells    Not Reportable  


 


Ur Squamous Epith Cells    0-5  (0-5)  /hpf


 


Urine Bacteria    Occasional  (FEW)  /hpf


 


Urine Mucus    Not seen  (FEW)  /hpf











Meds: 


Medications











Generic Name Dose Route Start Last Admin





  Trade Name Freq  PRN Reason Stop Dose Admin


 


Sodium Chloride  10 ml  03/24/19 13:53  03/24/19 13:55





  Saline Flush  FLUSH   10 ml





  ASDIRECTED PRN   Administration





  Keep Vein Open   





     





     





     














Discontinued Medications














Generic Name Dose Route Start Last Admin





  Trade Name Freq  PRN Reason Stop Dose Admin


 


Ondansetron HCl  4 mg  03/24/19 14:07  03/24/19 14:10





  Zofran  IVPUSH  03/24/19 14:08  4 mg





  ONETIME ONE   Administration





     





     





     





     














- Re-Assessments/Exams


Free Text/Narrative Re-Assessment/Exam: 





03/24/19 15:52


A stroke alert was called.  The patient's last time know well was today at 

noon.  I ordered an IV saline lock, EKG, CT of her head, labs and a UA.  Her 

EKG shows a sinus bradycardia.  She has flattened T waves in the lateral leads 

and Q waves in the anterior leads.  Her head CT shows senescent change.  

Incidental sinus finding.  Old jessie holes.  No acute intracranial abnormality 

is appreciated.


03/24/19 16:16


Her CBC and CMP look good.  Her troponin is negative.  Her UA shows no UTI.


03/24/19 16:17


Her PT and PTT look good.  She is now able to talk and she says she has a left 

sided headache.  She said she had a headache earlier and that is why she could 

not talk.  I did a sed rate and it was 35.  She says she has blurred vision in 

the left eye.  I examined her eye and I do not see a problem at this time.  I 

feel she needs to be admitted.  I called Dr Mccracken and he will come talk with 

the patient and her daughter.


03/24/19 16:41


Dr Mccracken accepted the patient.





Departure





- Departure


Time of Disposition: 16:45


Disposition: Admitted As Inpatient 66


Condition: Fair


Clinical Impression: 


 TIA (transient ischemic attack), Vision changes





Headache


Qualifiers:


 Headache type: unspecified Headache chronicity pattern: acute headache 

Intractability: not intractable Qualified Code(s): R51 - Headache








- Discharge Information


Referrals: 


Kade Cm MD [Primary Care Provider] - 


Forms:  ED Department Discharge





- My Orders


Last 24 Hours: 


My Active Orders





03/24/19 13:53


Cardiac Monitoring [RC] .AS DIRECTED 


Sodium Chloride 0.9% [Saline Flush]   10 ml FLUSH ASDIRECTED PRN 


Peripheral IV Insertion Adult [OM.PC] Stat 





03/24/19 13:54


EKG Documentation Completion [RC] STAT 


Oxygen Therapy [RC] PRN 


Peripheral IV Care [RC] .AS DIRECTED 














- Assessment/Plan


Last 24 Hours: 


My Active Orders





03/24/19 13:53


Cardiac Monitoring [RC] .AS DIRECTED 


Sodium Chloride 0.9% [Saline Flush]   10 ml FLUSH ASDIRECTED PRN 


Peripheral IV Insertion Adult [OM.PC] Stat 





03/24/19 13:54


EKG Documentation Completion [RC] STAT 


Oxygen Therapy [RC] PRN 


Peripheral IV Care [RC] .AS DIRECTED

## 2019-03-24 NOTE — PCM.HP
H&P History of Present Illness





- General


Date of Service: 03/24/19


Admit Problem/Dx: 


 Admission Diagnosis/Problem





Admission Diagnosis/Problem      TIA, Transient ischemic attack








Source of Information: Patient, Family, Old Records, Provider, RN Notes Reviewed


History Limitations: Reports: Language Barrier





- History of Present Illness


Initial Comments - Free Text/Narative: 


This is an 88 yo elderly white female with past medical hx/o HTN, HLD, MI S/p 

Stents Placements, S/p CABG, GERD, Back Pain, OA/DJD, Osteoporosis, Carotid 

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 Depression and 

Obesity Class II who comes in for evaluation of a possible stroke. Her daughter 

who went and joined her for lunch, noticed she had trouble speaking and saying 

sensible words. She also reports having a headache. Her last known well was 

about 1200 today. When she presented to ED, she could not speak but able to 

move all her extremities. She was able to get around and stand up. Additionally

, she was not able to answer questions but could follow commands. 





Her initial work up in ED shows a CBC remarkable for MCH of 25.3, MCHC of 30.7, 

Platelet of 115, Monocyte # of 0.66 and ESR of 35. Her Chemistry is significant 

for BS of 118, Albumin of 3.3. Her UA is not suggestive of UTI. Head CT scan 

report reads senescent change. Old jessie holes. Retention cysts, 1.3 cm in the 

sphenoid sinus. No acute intra-cranial abnormality is appreciated. 





Patient is being admitted for further evaluation of TIA. She is DNR/DNI.











- Related Data


Allergies/Adverse Reactions: 


 Allergies











Allergy/AdvReac Type Severity Reaction Status Date / Time


 


meperidine HCl [From Demerol] Allergy  Vomiting Verified 06/19/18 15:56


 


metformin Allergy  Other Verified 06/19/18 15:57


 


morphine Allergy  Vomiting Verified 06/19/18 15:57











Home Medications: 


 Home Meds





Carvedilol 12.5 mg PO BID 04/01/14 [History]


Citalopram [Citalopram Hbr] 10 mg PO DAILY 04/01/14 [History]


Clopidogrel [Plavix] 75 mg PO DAILY 04/01/14 [History]


Furosemide [Lasix] 40 mg PO BID 04/01/14 [History]


Insulin Aspart [NovoLOG] 9 units SUBCUT TIDAC 04/01/14 [History]


Insulin Glarg,Human.Rec.Analog [Lantus] 12 units SQ 1700 04/01/14 [History]


Insulin Glarg,Human.Rec.Analog [Lantus] 40 units SQ 0800 04/01/14 [History]


Levothyroxine 25 mcg PO DAILY 04/01/14 [History]


Lisinopril [Zestril] 20 mg PO Q12HR 04/01/14 [History]


Omeprazole 40 mg PO DAILY 04/01/14 [History]


atorvaSTATin [Lipitor] 20 mg PO BEDTIME 04/01/14 [History]


Acetaminophen [Tylenol] 650 mg PO BID 03/24/19 [History]


Diltiazem [Cardizem CD] 120 mg PO DAILY 03/24/19 [History]


Multivitamin [Multivitamins] 1 each PO DAILY 03/24/19 [History]


Nystatin [Nystatin Crm] 1 gm TOP BID PRN 03/24/19 [History]


Spironolactone [Aldactone] 12.5 mg PO DAILY 03/24/19 [History]











Past Medical History


Cardiovascular History: Reports: Bypass, High Cholesterol, Hypertension, MI, 

Stents


Gastrointestinal History: Reports: GERD


OB/GYN History: Reports: Pregnancy


Musculoskeletal History: Reports: Back Pain, Chronic, Osteoarthritis, 

Osteoporosis


Neurological History: Reports: Other (See Below)


Other Neuro History: has had brain surgery following fall to remove accumulated 

blood.


Psychiatric History: Reports: Depression


Endocrine/Metabolic History: Reports: Diabetes, Type II, Obesity/BMI 30+





- Past Surgical History


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





Social & Family History





- Family History


Family Medical History: Noncontributory





- Tobacco Use


Smoking Status *Q: Never Smoker





- Caffeine Use


Caffeine Use: Reports: Coffee





- Recreational Drug Use


Recreational Drug Use: No





- Living Situation & Occupation


Living situation: Reports: , Extended Care Facility (Currently living at 

[facility].)


Occupation: Retired





H&P Review of Systems





- Review of Systems:


Review Of Systems: See Below


General: Reports: Other (Could not speak right or talk).  Denies: Fever, Chills

, Malaise, Weakness, Fatigue


HEENT: Reports: No Symptoms


Pulmonary: Denies: Shortness of Breath


Cardiovascular: Denies: Chest Pain, Dyspnea on Exertion, Lightheadedness


Gastrointestinal: Denies: Abdominal Pain, Nausea, Vomiting


Genitourinary: Reports: No Symptoms


Musculoskeletal: Reports: No Symptoms


Skin: Denies: Cyanosis, Mottled, Pallor, Diaphoresis, Bruising, Erythema


Psychiatric: Denies: Depression, Anxiety, Agitation, Hallucinations, Suicidal 

Ideation


Neurological: Reports: Headache, Change in Speech.  Denies: Difficulty Walking, 

Weakness, Gait Disturbance


Hematologic/Lymphatic: Reports: No Symptoms


Immunologic: Reports: No Symptoms





Exam





- Exam


Exam: See Below





- Vital Signs


Vital Signs: 


 Last Vital Signs











Temp  36.9 C   03/24/19 21:08


 


Pulse  68   03/24/19 21:26


 


Resp  18   03/24/19 21:08


 


BP  158/61 H  03/24/19 21:26


 


Pulse Ox  96   03/24/19 21:08











Weight: 94.574 kg





- Exam


General: Alert, Oriented, Cooperative


HEENT: Conjunctiva Clear, EACs Clear, EOMI, Hearing Intact, Mucosa Moist & Pink

, Nares Patent, Normal Nasal Septum, Posterior Pharynx Clear, Pupils Equal, 

Pupils Reactive


Neck: Supple, Trachea Midline


Lungs: Normal Respiratory Effort


Cardiovascular: Regular Rate, Regular Rhythm


GI/Abdominal Exam: Normal Bowel Sounds, Soft, Non-Tender, No Organomegaly, No 

Distention, No Abnormal Bruit, No Mass, Other (Obese)


 (Female) Exam: Deferred


Rectal (Female) Exam: Deferred


Back Exam: Normal Inspection, Decreased Range of Motion


Extremities: Normal Inspection, Normal Range of Motion, Non-Tender, No Pedal 

Edema, Normal Capillary Refill


Peripheral Pulses: 2+: Posterior Tibial (L), Posterior Tibial (R), Dorsalis 

Pedis (L), Dorsalis Pedis (R)


Skin: Warm, Dry, Intact


Neuro Extensive - Mental Status: Normal Cognition, Memory Intact


Neuro Extensive - Motor, Sensory, Reflexes: CN II-XII Intact (grossly intact), 

Abnormal Gait


Psychiatric: Alert, Normal Affect, Normal Mood





- Patient Data


Lab Results Last 24 hrs: 


 Laboratory Results - last 24 hr











  03/24/19 03/24/19 03/24/19 Range/Units





  13:53 13:55 13:55 


 


WBC   7.04   (3.98-10.04)  K/mm3


 


RBC   4.70   (3.98-5.22)  M/mm3


 


Hgb   11.9   (11.2-15.7)  gm/L


 


Hct   38.8   (34.1-44.9)  %


 


MCV   82.6   (79.4-94.8)  fl


 


MCH   25.3 L   (25.6-32.2)  pg


 


MCHC   30.7 L   (32.2-35.5)  g/dl


 


RDW Std Deviation   44.7   (36.4-46.3)  fL


 


Plt Count   115 L   (182-369)  K/mm3


 


MPV   11.6   (9.4-12.3)  fl


 


Neut % (Auto)   58.8   (34.0-71.1)  %


 


Lymph % (Auto)   28.6   (19.3-51.7)  %


 


Mono % (Auto)   9.4   (4.7-12.5)  %


 


Eos % (Auto)   2.8   (0.7-5.8)  


 


Baso % (Auto)   0.4   (0.1-1.2)  %


 


Neut # (Auto)   4.14   (1.56-6.13)  K/mm3


 


Lymph # (Auto)   2.01   (1.18-3.74)  K/mm3


 


Mono # (Auto)   0.66 H   (0.24-0.36)  K/mm3


 


Eos # (Auto)   0.20   (0.04-0.36)  K/mm3


 


Baso # (Auto)   0.03   (0.01-0.08)  K/mm3


 


ESR     (0-20)  mm/hr


 


PT     (9.5-12.1)  SECONDS


 


INR     


 


APTT     (24-31)  SECONDS


 


Sodium    138  (136-145)  mEq/L


 


Potassium    3.6  (3.5-5.1)  mEq/L


 


Chloride    99  ()  mEq/L


 


Carbon Dioxide    30  (21-32)  mEq/L


 


Anion Gap    12.6  (5-15)  


 


BUN    15  (7-18)  mg/dL


 


Creatinine    1.0  (0.55-1.02)  mg/dL


 


Est Cr Clr Drug Dosing    35.70  mL/min


 


Estimated GFR (MDRD)    52  (>60)  mL/min


 


BUN/Creatinine Ratio    15.0  (14-18)  


 


Glucose    97  ()  mg/dL


 


POC Glucose  118 H    ()  mg/dL


 


Calcium    9.0  (8.5-10.1)  mg/dL


 


Total Bilirubin    0.2  (0.2-1.0)  mg/dL


 


AST    24  (15-37)  U/L


 


ALT    29  (14-59)  U/L


 


Alkaline Phosphatase    94  ()  U/L


 


Troponin I    < 0.017  (0.00-0.056)  ng/mL


 


Total Protein    7.4  (6.4-8.2)  g/dl


 


Albumin    3.3 L  (3.4-5.0)  g/dl


 


Globulin    4.1  gm/dL


 


Albumin/Globulin Ratio    0.8 L  (1-2)  


 


Urine Color     (Yellow)  


 


Urine Appearance     (Clear)  


 


Urine pH     (5.0-8.0)  


 


Ur Specific Gravity     (1.005-1.030)  


 


Urine Protein     (Negative)  


 


Urine Glucose (UA)     (Negative)  


 


Urine Ketones     (Negative)  


 


Urine Occult Blood     (Negative)  


 


Urine Nitrite     (Negative)  


 


Urine Bilirubin     (Negative)  


 


Urine Urobilinogen     (0.2-1.0)  


 


Ur Leukocyte Esterase     (Negative)  


 


Urine RBC     (0-5)  /hpf


 


Urine WBC     (0-5)  /hpf


 


Ur Epithelial Cells     


 


Ur Squamous Epith Cells     (0-5)  /hpf


 


Urine Bacteria     (FEW)  /hpf


 


Urine Mucus     (FEW)  /hpf


 


MRSA (PCR)     














  03/24/19 03/24/19 03/24/19 Range/Units





  13:55 14:25 14:50 


 


WBC     (3.98-10.04)  K/mm3


 


RBC     (3.98-5.22)  M/mm3


 


Hgb     (11.2-15.7)  gm/L


 


Hct     (34.1-44.9)  %


 


MCV     (79.4-94.8)  fl


 


MCH     (25.6-32.2)  pg


 


MCHC     (32.2-35.5)  g/dl


 


RDW Std Deviation     (36.4-46.3)  fL


 


Plt Count     (182-369)  K/mm3


 


MPV     (9.4-12.3)  fl


 


Neut % (Auto)     (34.0-71.1)  %


 


Lymph % (Auto)     (19.3-51.7)  %


 


Mono % (Auto)     (4.7-12.5)  %


 


Eos % (Auto)     (0.7-5.8)  


 


Baso % (Auto)     (0.1-1.2)  %


 


Neut # (Auto)     (1.56-6.13)  K/mm3


 


Lymph # (Auto)     (1.18-3.74)  K/mm3


 


Mono # (Auto)     (0.24-0.36)  K/mm3


 


Eos # (Auto)     (0.04-0.36)  K/mm3


 


Baso # (Auto)     (0.01-0.08)  K/mm3


 


ESR  35 H    (0-20)  mm/hr


 


PT   11.1   (9.5-12.1)  SECONDS


 


INR   1.02   


 


APTT   28   (24-31)  SECONDS


 


Sodium     (136-145)  mEq/L


 


Potassium     (3.5-5.1)  mEq/L


 


Chloride     ()  mEq/L


 


Carbon Dioxide     (21-32)  mEq/L


 


Anion Gap     (5-15)  


 


BUN     (7-18)  mg/dL


 


Creatinine     (0.55-1.02)  mg/dL


 


Est Cr Clr Drug Dosing     mL/min


 


Estimated GFR (MDRD)     (>60)  mL/min


 


BUN/Creatinine Ratio     (14-18)  


 


Glucose     ()  mg/dL


 


POC Glucose     ()  mg/dL


 


Calcium     (8.5-10.1)  mg/dL


 


Total Bilirubin     (0.2-1.0)  mg/dL


 


AST     (15-37)  U/L


 


ALT     (14-59)  U/L


 


Alkaline Phosphatase     ()  U/L


 


Troponin I     (0.00-0.056)  ng/mL


 


Total Protein     (6.4-8.2)  g/dl


 


Albumin     (3.4-5.0)  g/dl


 


Globulin     gm/dL


 


Albumin/Globulin Ratio     (1-2)  


 


Urine Color    Yellow  (Yellow)  


 


Urine Appearance    Clear  (Clear)  


 


Urine pH    6.0  (5.0-8.0)  


 


Ur Specific Gravity    1.015  (1.005-1.030)  


 


Urine Protein    Negative  (Negative)  


 


Urine Glucose (UA)    Negative  (Negative)  


 


Urine Ketones    Negative  (Negative)  


 


Urine Occult Blood    Negative  (Negative)  


 


Urine Nitrite    Negative  (Negative)  


 


Urine Bilirubin    Negative  (Negative)  


 


Urine Urobilinogen    0.2  (0.2-1.0)  


 


Ur Leukocyte Esterase    Negative  (Negative)  


 


Urine RBC    0-5  (0-5)  /hpf


 


Urine WBC    0-5  (0-5)  /hpf


 


Ur Epithelial Cells    Not Reportable  


 


Ur Squamous Epith Cells    0-5  (0-5)  /hpf


 


Urine Bacteria    Occasional  (FEW)  /hpf


 


Urine Mucus    Not seen  (FEW)  /hpf


 


MRSA (PCR)     














  03/24/19 03/24/19 03/24/19 Range/Units





  18:00 18:36 21:29 


 


WBC     (3.98-10.04)  K/mm3


 


RBC     (3.98-5.22)  M/mm3


 


Hgb     (11.2-15.7)  gm/L


 


Hct     (34.1-44.9)  %


 


MCV     (79.4-94.8)  fl


 


MCH     (25.6-32.2)  pg


 


MCHC     (32.2-35.5)  g/dl


 


RDW Std Deviation     (36.4-46.3)  fL


 


Plt Count     (182-369)  K/mm3


 


MPV     (9.4-12.3)  fl


 


Neut % (Auto)     (34.0-71.1)  %


 


Lymph % (Auto)     (19.3-51.7)  %


 


Mono % (Auto)     (4.7-12.5)  %


 


Eos % (Auto)     (0.7-5.8)  


 


Baso % (Auto)     (0.1-1.2)  %


 


Neut # (Auto)     (1.56-6.13)  K/mm3


 


Lymph # (Auto)     (1.18-3.74)  K/mm3


 


Mono # (Auto)     (0.24-0.36)  K/mm3


 


Eos # (Auto)     (0.04-0.36)  K/mm3


 


Baso # (Auto)     (0.01-0.08)  K/mm3


 


ESR     (0-20)  mm/hr


 


PT     (9.5-12.1)  SECONDS


 


INR     


 


APTT     (24-31)  SECONDS


 


Sodium     (136-145)  mEq/L


 


Potassium     (3.5-5.1)  mEq/L


 


Chloride     ()  mEq/L


 


Carbon Dioxide     (21-32)  mEq/L


 


Anion Gap     (5-15)  


 


BUN     (7-18)  mg/dL


 


Creatinine     (0.55-1.02)  mg/dL


 


Est Cr Clr Drug Dosing     mL/min


 


Estimated GFR (MDRD)     (>60)  mL/min


 


BUN/Creatinine Ratio     (14-18)  


 


Glucose     ()  mg/dL


 


POC Glucose   110  159 H  ()  mg/dL


 


Calcium     (8.5-10.1)  mg/dL


 


Total Bilirubin     (0.2-1.0)  mg/dL


 


AST     (15-37)  U/L


 


ALT     (14-59)  U/L


 


Alkaline Phosphatase     ()  U/L


 


Troponin I     (0.00-0.056)  ng/mL


 


Total Protein     (6.4-8.2)  g/dl


 


Albumin     (3.4-5.0)  g/dl


 


Globulin     gm/dL


 


Albumin/Globulin Ratio     (1-2)  


 


Urine Color     (Yellow)  


 


Urine Appearance     (Clear)  


 


Urine pH     (5.0-8.0)  


 


Ur Specific Gravity     (1.005-1.030)  


 


Urine Protein     (Negative)  


 


Urine Glucose (UA)     (Negative)  


 


Urine Ketones     (Negative)  


 


Urine Occult Blood     (Negative)  


 


Urine Nitrite     (Negative)  


 


Urine Bilirubin     (Negative)  


 


Urine Urobilinogen     (0.2-1.0)  


 


Ur Leukocyte Esterase     (Negative)  


 


Urine RBC     (0-5)  /hpf


 


Urine WBC     (0-5)  /hpf


 


Ur Epithelial Cells     


 


Ur Squamous Epith Cells     (0-5)  /hpf


 


Urine Bacteria     (FEW)  /hpf


 


Urine Mucus     (FEW)  /hpf


 


MRSA (PCR)  Negative    











Result Diagrams: 


 03/24/19 13:55





 03/24/19 13:55





EKG INTERPRETATION


EKG Date: 03/24/19


Time: 14:11


Rhythm: Other (Sonus Bradycardia)


Rate (Beats/Min): 51


Axis: Normal


P-Wave: Present


QRS: Normal


ST-T: Other (flat t wave in lateral leads)


QT: Normal


Problem List Initiated/Reviewed/Updated: Yes


Orders Last 24hrs: 


 Active Orders 24 hr











 Category Date Time Status


 


 Admission Status [Patient Status] [ADT] Routine ADT  03/24/19 16:53 Active


 


 Accu Check [Blood Glucose Check, Bedside] [RC] Care  03/24/19 18:16 Active





 QIDACANDBED   


 


 Antiembolic Devices [RC] 10,22 Care  03/24/19 18:03 Active


 


 Cardiac Monitoring [RC] .AS DIRECTED Care  03/24/19 13:53 Active


 


 Cardiac Monitoring [RC] CONTINUOUS Care  03/24/19 18:02 Active


 


 Height and Weight [RC] 04 Care  03/24/19 18:02 Active


 


 Intake and Output [RC] 04,16 Care  03/24/19 18:02 Active


 


 Oxygen Therapy [RC] PRN Care  03/24/19 18:02 Active


 


 RT Aerosol Therapy [RC] ASDIRECTED Care  03/24/19 18:04 Active


 


 Up With Assistance [RC] ASDIRECTED Care  03/24/19 18:02 Active


 


 Up ad Sara [RC] ASDIRECTED Care  03/24/19 18:02 Active


 


 VTE/DVT Education [RC] 10,22 Care  03/24/19 18:02 Active


 


 Vital Signs [RC] Q4HR Care  03/24/19 18:02 Active


 


 Consult to Case Management/ [CONS] Cons  03/24/19 18:02 Active





 Routine   


 


 Consult to Spiritual Care [CONS] Routine Cons  03/24/19 18:02 Active


 


 OT Evaluation and Treatment [CONS] Routine Cons  03/24/19 18:02 Active


 


 PT Evaluation and Treatment [CONS] Routine Cons  03/24/19 18:02 Active


 


 SLP Evaluation and Treatment [CONS] Routine Cons  03/24/19 18:02 Active


 


 Consistent Carbohydrate Diet [DIET] Diet  03/24/19 Dinner Active


 


 Heart Healthy Diet [DIET] Diet  03/24/19 Dinner Active


 


 Brain wo Cont [MR] Routine Exams  03/25/19 09:00 Ordered


 


 Carotid Comp [US] Routine Exams  03/25/19 09:00 Ordered


 


 Echo Comp wo Cont [US] Routine Exams  03/25/19 09:00 Ordered


 


 A1C [GLYCOSYLATED HEMOGLOBIN,HGBA1C] [CHEM] AM Lab  03/25/19 05:11 Ordered


 


 BASIC METABOLIC PANEL,BMP [CHEM] AM Lab  03/25/19 05:11 Ordered


 


 BASIC METABOLIC PANEL,BMP [CHEM] AM Lab  03/26/19 05:11 Ordered


 


 BASIC METABOLIC PANEL,BMP [CHEM] AM Lab  03/27/19 05:11 Ordered


 


 CBC WITH AUTO DIFF [HEME] AM Lab  03/25/19 05:11 Ordered


 


 LIPID PANEL [CHEM] AM Lab  03/25/19 05:11 Ordered


 


 MAGNESIUM [CHEM] AM Lab  03/25/19 05:11 Ordered


 


 MAGNESIUM [CHEM] AM Lab  03/26/19 05:11 Ordered


 


 MAGNESIUM [CHEM] AM Lab  03/27/19 05:11 Ordered


 


 Acetaminophen [Tylenol] Med  03/24/19 22:45 Active





 650 mg PO BID   


 


 Acetaminophen [Tylenol] Med  03/24/19 18:02 Active





 650 mg PO Q4H PRN   


 


 Acetaminophen/HYDROcodone [Norco 325-5 MG] Med  03/24/19 18:02 Active





 1 tab PO Q4H PRN   


 


 Albuterol/Ipratropium [DuoNeb 3.0-0.5 MG/3 ML] Med  03/24/19 18:02 Active





 3 ml NEB Q4H PRN   


 


 Bisacodyl [Dulcolax] Med  03/24/19 18:02 Active





 5 mg PO DAILY PRN   


 


 Carvedilol [Coreg] Med  03/24/19 21:00 Active





 25 mg PO BID   


 


 Citalopram [Celexa] Med  03/25/19 09:00 Active





 10 mg PO DAILY   


 


 Clopidogrel [Plavix] Med  03/25/19 09:00 Active





 75 mg PO DAILY   


 


 Dextrose 50% in Water Med  03/24/19 18:16 Active





 50 ml IVPUSH ASDIRECTED PRN   


 


 Diltiazem [Cardizem CD] Med  03/25/19 09:00 Active





 120 mg PO DAILY   


 


 Docusate Sodium [Colace] Med  03/24/19 18:02 Active





 100 mg PO BID PRN   


 


 Docusate Sodium/Sennosides [Senna Plus] Med  03/24/19 18:02 Active





 1 tab PO BID PRN   


 


 Furosemide [Lasix] Med  03/25/19 09:00 Active





 20 mg PO DAILY   


 


 HYDROmorphone [Dilaudid] Med  03/24/19 18:02 Active





 0.25 mg IVPUSH Q2H PRN   


 


 Hydrocortisone [Hydrocortisone 1% Crm] Med  03/24/19 21:00 Active





 0.001 gm TOP BID   


 


 Insulin Glarg,Human.Rec.Analog [LantUS] Med  03/25/19 17:00 Active





 12 unit SUBCUT DAILY@1700   


 


 Insulin Glarg,Human.Rec.Analog [LantUS] Med  03/25/19 08:00 Active





 40 unit SUBCUT DAILY@0800   


 


 Insulin Lispro [HumaLOG] Med  03/24/19 22:00 Active





 See Protocol  SUBCUT QIDACANDBED   


 


 LORazepam [Ativan] Med  03/24/19 18:02 Active





 0.25 mg IV Q6H PRN   


 


 LORazepam [Ativan] Med  03/24/19 18:01 Active





 2 mg IVPUSH Q4H PRN   


 


 Levothyroxine Med  03/25/19 09:00 Active





 25 mcg PO DAILY   


 


 Lisinopril [Prinivil] Med  03/24/19 21:00 Active





 20 mg PO Q12HR   


 


 Metoprolol Tartrate [Lopressor] Med  03/24/19 18:01 Active





 5 mg IVPUSH Q4H PRN   


 


 Multivitamins,Therapeutic [Thera] Med  03/25/19 09:00 Active





 1 each PO DAILY   


 


 Nystatin [Nystatin Crm] Med  03/24/19 21:00 Active





 0 gm TOP BID   


 


 Ondansetron [Zofran] Med  03/24/19 18:02 Active





 4 mg IV Q6H PRN   


 


 Pantoprazole [ProTONIX***] Med  03/25/19 07:00 Active





 40 mg PO DAILY@0700   


 


 Pharmacy to Dose - Magnesium R [Pharmacy to Dose - Med  03/24/19 18:15 Pending





 Magnesium Replacement]   





 1 dose .XX ASDIRECTED   


 


 Pharmacy to Dose - Potassium R [Pharmacy to Dose - Med  03/24/19 18:15 Pending





 Potassium Replacement]   





 1 dose .XX ASDIRECTED   


 


 Polyethylene Glycol 3350 [MiraLAX] Med  03/24/19 18:02 Active





 17 gm PO DAILY PRN   


 


 Simvastatin [Zocor] Med  03/24/19 21:00 Active





 20 mg PO BEDTIME   


 


 Sodium Chloride 0.9% [Saline Flush] Med  03/24/19 13:53 Active





 10 ml FLUSH ASDIRECTED PRN   


 


 Spironolactone [Aldactone] Med  03/25/19 09:00 Active





 12.5 mg PO DAILY   


 


 hydrALAZINE [Apresoline] Med  03/24/19 18:01 Active





 10 mg IVPUSH Q4H PRN   


 


 Peripheral IV Insertion Adult [OM.PC] Stat Oth  03/24/19 13:53 Ordered


 


 JEAN-PIERRE Hose [Antiembolic Hose] [OM.PC] Routine Oth  03/24/19 20:11 Ordered


 


 Resuscitation Status Routine Resus Stat  03/24/19 17:54 Ordered








 Medication Orders





Acetaminophen (Tylenol)  650 mg PO Q4H PRN


   PRN Reason: Pain (Mild 1-3)/fever


Acetaminophen (Tylenol)  650 mg PO BID [facility]


Hydrocodone Bitart/Acetaminophen (Norco 325-5 Mg)  1 tab PO Q4H PRN


   PRN Reason: Pain (moderate 4-6)


Albuterol/Ipratropium (Duoneb 3.0-0.5 Mg/3 Ml)  3 ml NEB Q4H PRN


   PRN Reason: Shortness Of Breath/wheezing


Bisacodyl (Dulcolax)  5 mg PO DAILY PRN


   PRN Reason: Constipation


Carvedilol (Coreg)  25 mg PO BID [facility]


   Last Admin: 03/24/19 21:26  Dose: 25 mg


Citalopram Hydrobromide (Celexa)  10 mg PO DAILY [facility]


Clopidogrel Bisulfate (Plavix)  75 mg PO DAILY [facility]


Dextrose/Water (Dextrose 50% In Water)  50 ml IVPUSH ASDIRECTED PRN


   PRN Reason: Hypoglycemia


Diltiazem HCl (Cardizem Cd)  120 mg PO DAILY [facility]


Docusate Sodium (Colace)  100 mg PO BID PRN


   PRN Reason: Constipation


Furosemide (Lasix)  20 mg PO DAILY [facility]


Hydralazine HCl (Apresoline)  10 mg IVPUSH Q4H PRN


   PRN Reason: Hypertension


Hydrocortisone (Hydrocortisone 1% Crm)  0.001 gm TOP BID [facility]


   Last Admin: 03/24/19 21:27 Dose:  Not Given


Hydromorphone HCl (Dilaudid)  0.25 mg IVPUSH Q2H PRN


   PRN Reason: Pain (severe 7-10)


Insulin Glargine (Lantus)  12 unit SUBCUT DAILY@1700 [facility]


Insulin Glargine (Lantus)  40 unit SUBCUT DAILY@0800 [facility]


Insulin Human Lispro (Humalog)  0 unit SUBCUT QIDACANDBED [facility]; Protocol


   Last Admin: 03/24/19 21:31 Dose:  Not Given


Levothyroxine Sodium (Levothyroxine)  25 mcg PO DAILY [facility]


Lisinopril (Prinivil)  20 mg PO Q12HR [facility]


   Last Admin: 03/24/19 21:26  Dose: 20 mg


Lorazepam (Ativan)  2 mg IVPUSH Q4H PRN


   PRN Reason: Seizures


Lorazepam (Ativan)  0.25 mg IV Q6H PRN


   PRN Reason: Anxiety


Magnesium Sulfate (Pharmacy To Dose - Magnesium Replacement)  1 dose .XX 

ASDIRECTED [facility]


Metoprolol Tartrate (Lopressor)  5 mg IVPUSH Q4H PRN


   PRN Reason: Tachycardia


Multivitamins (Thera)  1 each PO DAILY [facility]


Nystatin (Nystatin Crm)  0 gm TOP BID [facility]


   Last Admin: 03/24/19 21:28 Dose:  Not Given


Ondansetron HCl (Zofran)  4 mg IV Q6H PRN


   PRN Reason: Nausea/Vomiting


Pantoprazole Sodium (Protonix***)  40 mg PO DAILY@0700 [facility]


Polyethylene Glycol (Miralax)  17 gm PO DAILY PRN


   PRN Reason: Constipation


Potassium Chloride (Pharmacy To Dose - Potassium Replacement)  1 dose .XX 

ASDIRECTED [facility]


Senna/Docusate Sodium (Senna Plus)  1 tab PO BID PRN


   PRN Reason: Constipation


Simvastatin (Zocor)  20 mg PO BEDTIME [facility]


   Last Admin: 03/24/19 21:26  Dose: 20 mg


Sodium Chloride (Saline Flush)  10 ml FLUSH ASDIRECTED PRN


   PRN Reason: Keep Vein Open


   Last Admin: 03/24/19 13:55  Dose: 10 ml


Spironolactone (Aldactone)  12.5 mg PO DAILY [facility]








Assessment/Plan Comment:: 


Assessment/Plan:


Acute:


TIA


   - Unclear in etiology


   - Suspect cardio embolic stroke; HR 51 (bradycardia)


   - Risk Factors: HTN, HLD, Carotid Atherosclerosis and DM2 


   - Head CT scan: senescent change. Old jessie holes. Retention cysts, 1.3 cm in 

the sphenoid sinus. No acute intra-cranial abnormality is appreciated. 


   - BS is 118 on presentation


   - UA negative for UTI


   - Also aware at this time treatment is secondary prevention and additional 

tests will be done tomorrow 


   - NIH score is 0


   - Patient already on Plavix, Coreg  and Statin


   - Stroke Protocol: Lipid Panel, Aspiration Precaution, Brain MRI, 2D Echo, 

Carotid U/S, SLP eval, PT/OT and Neuro check BID 





Thrombocytopenia


   - Platelet of 115 (baseline > 200 for 2014 & 2018)


   - She takes Plavix daily


   - The rest of her CBC is wnl





Chronic:


HTN


HLD


MI S/p Stents Placements


S/p CABG


GERD


Back Pain


OA/DJD


Osteoporosis


Carotid Atherosclerosis 


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


DM2


Depression


Obesity Class II





Plan:


Med-Surg with Telemetry


Resume Home Meds


Stroke Protocol


A1C in AM


Brain MRI, 2D echo and Carotid Duplex U/S in AM


PRN Medications


Fall/Aspiration precautions


PT/OT/SLP consult


SW/CM for d/c planning


Code status: 1

## 2019-03-24 NOTE — CT
Head CT

 

Technique: Multiple axial sections through the brain were obtained.  

Intravenous contrast was not utilized.

 

Comparison: Prior head CT study of 06/19/18.

 

Findings: Ventricles along with basal cisterns and sulci over the 

convexities are moderately prominent.  Diminished density is noted 

within the periventricular and subcortical white matter compatible 

with small vessel ischemic demyelination change.  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  No 

other abnormal parenchymal densities are seen.  No evidence of 

intracranial hemorrhage.  No midline shift or mass effect is seen.

 

Bone window settings were reviewed which shows bilateral jessie holes.  

No acute calvarial abnormality is seen.  Rounded soft tissue density 

seen within the sphenoid sinus compatible with retention cyst 

measuring 1.3 cm.

 

Impression:

1.  Senescent change as noted above.

2.  Incidental sinus finding.

3.  Old jessie holes.

4.  No acute intracranial abnormality is appreciated.

 

Diagnostic code #2

## 2019-03-25 LAB — HBA1C BLD-MCNC: 8.6 % (ref 4.5–6.2)

## 2019-03-25 RX ADMIN — THERA TABS SCH EACH: TAB at 09:05

## 2019-03-25 RX ADMIN — DILTIAZEM HYDROCHLORIDE SCH MG: 120 CAPSULE, COATED, EXTENDED RELEASE ORAL at 08:49

## 2019-03-25 RX ADMIN — NYSTATIN SCH APPLIC: 100000 CREAM TOPICAL at 20:22

## 2019-03-25 RX ADMIN — NYSTATIN SCH APPLIC: 100000 CREAM TOPICAL at 09:07

## 2019-03-25 RX ADMIN — INSULIN GLARGINE SCH UNITS: 100 INJECTION, SOLUTION SUBCUTANEOUS at 09:05

## 2019-03-25 NOTE — MR
MRI brain

 

Technique: T1 sagittal; T2, T2 FLAIR, T1, gradient echo and diffusion 

axial; T1-weighted coronal and gradient echo coronal images were 

obtained.

 

Comparison: Previous head CT exam of 03/24/19.

 

Findings: Ventricles along the basal cisterns and sulci over the 

convexities are moderately prominent.  No acute diffusion 

abnormalities are seen.  Bilateral jessie holes are again noted.  Sinus 

finding is seen inferiorly most likely due to retention cyst measuring

 1.0 cm.  Normal signal void is seen within the major cerebral 

arteries within the skull base.

 

Diffuse increased signal is noted within the subcortical and 

periventricular white matter compatible with small vessel ischemic 

demyelination change.  No other abnormal signal is seen within the 

brain parenchyma.  No midline shift or mass effect is present.

 

Impression:

1.  Senescent change as noted above.

2.  No acute diffusion abnormalities are appreciated.

3.  Other incidental findings.

 

Diagnostic code #2

## 2019-03-25 NOTE — US
Carotid ultrasound: Duplex and color flow imaging was obtained of the 

carotid arteries.

 

Mild amount of plaque identified within the right carotid bulb and 

extending into the internal carotid artery.

 

Right side:

CCA has a peak systolic velocity of 0.95 m/sec.

ICA has a peak systolic velocity of 1.09 m/sec and peak end-diastolic 

velocity of 0.17 m/sec.

ECA has a peak systolic velocity of 1.43 m/sec.

ICA/CCA ratio is 1.1.

Vertebral artery has a peak systolic velocity of 0.88.

 

 

Left side:

CCA has a peak systolic velocity of 1.42 m/sec.

ICA has a peak systolic velocity of 0.91 m/sec and peak end-diastolic 

velocity of 0.18 m/sec.

ECA has a peak systolic velocity of 1.34 m/sec.

ICA/CCA ratio is 0.6.

Vertebral artery not visualized

 

Impression:

1.  Mild amount of plaque within the right side.

2.  Velocity measurements correspond to stenosis in the range of 1-49%

 within both internal carotid arteries.

3.  Left vertebral artery not visualized, but normal signal void is 

seen within the distal left vertebral artery on previous MRI.

 

Diagnostic code #2

## 2019-03-25 NOTE — PCM.PN
- General Info


Date of Service: 03/25/19


Functional Status: Reports: Pain Controlled, Tolerating Diet, Ambulating, 

Urinating





- Review of Systems


General: Reports: No Symptoms


HEENT: Reports: No Symptoms


Pulmonary: Reports: No Symptoms


Cardiovascular: Reports: No Symptoms


Gastrointestinal: Reports: No Symptoms


Genitourinary: Reports: No Symptoms


Musculoskeletal: Reports: No Symptoms


Skin: Reports: No Symptoms


Neurological: Reports: No Symptoms


Psychiatric: Reports: No Symptoms





- Patient Data


Vitals - Most Recent: 


 Last Vital Signs











Temp  37.0 C   03/25/19 15:19


 


Pulse  58 L  03/25/19 15:19


 


Resp  16   03/25/19 15:19


 


BP  157/51 H  03/25/19 15:19


 


Pulse Ox  94 L  03/25/19 15:19











Weight - Most Recent: 93.531 kg


I&O - Last 24 Hours: 


 Intake & Output











 03/25/19 03/25/19 03/25/19





 06:59 14:59 22:59


 


Intake Total 600 660 200


 


Output Total 950  


 


Balance -350 660 200











Lab Results Last 24 Hours: 


 Laboratory Results - last 24 hr











  03/24/19 03/24/19 03/24/19 Range/Units





  18:00 18:36 21:29 


 


WBC     (3.98-10.04)  K/mm3


 


RBC     (3.98-5.22)  M/mm3


 


Hgb     (11.2-15.7)  gm/L


 


Hct     (34.1-44.9)  %


 


MCV     (79.4-94.8)  fl


 


MCH     (25.6-32.2)  pg


 


MCHC     (32.2-35.5)  g/dl


 


RDW Std Deviation     (36.4-46.3)  fL


 


Plt Count     (182-369)  K/mm3


 


MPV     (9.4-12.3)  fl


 


Neut % (Auto)     (34.0-71.1)  %


 


Lymph % (Auto)     (19.3-51.7)  %


 


Mono % (Auto)     (4.7-12.5)  %


 


Eos % (Auto)     (0.7-5.8)  


 


Baso % (Auto)     (0.1-1.2)  %


 


Neut # (Auto)     (1.56-6.13)  K/mm3


 


Lymph # (Auto)     (1.18-3.74)  K/mm3


 


Mono # (Auto)     (0.24-0.36)  K/mm3


 


Eos # (Auto)     (0.04-0.36)  K/mm3


 


Baso # (Auto)     (0.01-0.08)  K/mm3


 


Sodium     (136-145)  mEq/L


 


Potassium     (3.5-5.1)  mEq/L


 


Chloride     ()  mEq/L


 


Carbon Dioxide     (21-32)  mEq/L


 


Anion Gap     (5-15)  


 


BUN     (7-18)  mg/dL


 


Creatinine     (0.55-1.02)  mg/dL


 


Est Cr Clr Drug Dosing     mL/min


 


Estimated GFR (MDRD)     (>60)  mL/min


 


BUN/Creatinine Ratio     (14-18)  


 


Glucose     ()  mg/dL


 


POC Glucose   110  159 H  ()  mg/dL


 


Hemoglobin A1c     (4.50-6.20)  %


 


Calcium     (8.5-10.1)  mg/dL


 


Magnesium     (1.8-2.4)  mg/dl


 


Triglycerides     (<150)  mg/dL


 


Cholesterol     (<200)  mg/dL


 


LDL Cholesterol Direct     (<100)  mg/dL


 


HDL Cholesterol     (40-59)  mg/dL


 


MRSA (PCR)  Negative    














  03/25/19 03/25/19 03/25/19 Range/Units





  05:45 05:45 05:45 


 


WBC  7.34    (3.98-10.04)  K/mm3


 


RBC  4.42    (3.98-5.22)  M/mm3


 


Hgb  11.1 L    (11.2-15.7)  gm/L


 


Hct  36.6    (34.1-44.9)  %


 


MCV  82.8    (79.4-94.8)  fl


 


MCH  25.1 L    (25.6-32.2)  pg


 


MCHC  30.3 L    (32.2-35.5)  g/dl


 


RDW Std Deviation  44.1    (36.4-46.3)  fL


 


Plt Count  197    (182-369)  K/mm3


 


MPV  11.0    (9.4-12.3)  fl


 


Neut % (Auto)  58.2    (34.0-71.1)  %


 


Lymph % (Auto)  29.6    (19.3-51.7)  %


 


Mono % (Auto)  9.8    (4.7-12.5)  %


 


Eos % (Auto)  1.9    (0.7-5.8)  


 


Baso % (Auto)  0.4    (0.1-1.2)  %


 


Neut # (Auto)  4.27    (1.56-6.13)  K/mm3


 


Lymph # (Auto)  2.17    (1.18-3.74)  K/mm3


 


Mono # (Auto)  0.72 H    (0.24-0.36)  K/mm3


 


Eos # (Auto)  0.14    (0.04-0.36)  K/mm3


 


Baso # (Auto)  0.03    (0.01-0.08)  K/mm3


 


Sodium   140   (136-145)  mEq/L


 


Potassium   3.7   (3.5-5.1)  mEq/L


 


Chloride   102   ()  mEq/L


 


Carbon Dioxide   29   (21-32)  mEq/L


 


Anion Gap   12.7   (5-15)  


 


BUN   19 H   (7-18)  mg/dL


 


Creatinine   1.1 H   (0.55-1.02)  mg/dL


 


Est Cr Clr Drug Dosing   32.46   mL/min


 


Estimated GFR (MDRD)   47   (>60)  mL/min


 


BUN/Creatinine Ratio   17.3   (14-18)  


 


Glucose   76 L   ()  mg/dL


 


POC Glucose     ()  mg/dL


 


Hemoglobin A1c    8.60 H  (4.50-6.20)  %


 


Calcium   9.0   (8.5-10.1)  mg/dL


 


Magnesium   2.0   (1.8-2.4)  mg/dl


 


Triglycerides   117   (<150)  mg/dL


 


Cholesterol   123   (<200)  mg/dL


 


LDL Cholesterol Direct   72   (<100)  mg/dL


 


HDL Cholesterol   37.0 L   (40-59)  mg/dL


 


MRSA (PCR)     














  03/25/19 03/25/19 Range/Units





  06:32 12:13 


 


WBC    (3.98-10.04)  K/mm3


 


RBC    (3.98-5.22)  M/mm3


 


Hgb    (11.2-15.7)  gm/L


 


Hct    (34.1-44.9)  %


 


MCV    (79.4-94.8)  fl


 


MCH    (25.6-32.2)  pg


 


MCHC    (32.2-35.5)  g/dl


 


RDW Std Deviation    (36.4-46.3)  fL


 


Plt Count    (182-369)  K/mm3


 


MPV    (9.4-12.3)  fl


 


Neut % (Auto)    (34.0-71.1)  %


 


Lymph % (Auto)    (19.3-51.7)  %


 


Mono % (Auto)    (4.7-12.5)  %


 


Eos % (Auto)    (0.7-5.8)  


 


Baso % (Auto)    (0.1-1.2)  %


 


Neut # (Auto)    (1.56-6.13)  K/mm3


 


Lymph # (Auto)    (1.18-3.74)  K/mm3


 


Mono # (Auto)    (0.24-0.36)  K/mm3


 


Eos # (Auto)    (0.04-0.36)  K/mm3


 


Baso # (Auto)    (0.01-0.08)  K/mm3


 


Sodium    (136-145)  mEq/L


 


Potassium    (3.5-5.1)  mEq/L


 


Chloride    ()  mEq/L


 


Carbon Dioxide    (21-32)  mEq/L


 


Anion Gap    (5-15)  


 


BUN    (7-18)  mg/dL


 


Creatinine    (0.55-1.02)  mg/dL


 


Est Cr Clr Drug Dosing    mL/min


 


Estimated GFR (MDRD)    (>60)  mL/min


 


BUN/Creatinine Ratio    (14-18)  


 


Glucose    ()  mg/dL


 


POC Glucose  77 L  183 H  ()  mg/dL


 


Hemoglobin A1c    (4.50-6.20)  %


 


Calcium    (8.5-10.1)  mg/dL


 


Magnesium    (1.8-2.4)  mg/dl


 


Triglycerides    (<150)  mg/dL


 


Cholesterol    (<200)  mg/dL


 


LDL Cholesterol Direct    (<100)  mg/dL


 


HDL Cholesterol    (40-59)  mg/dL


 


MRSA (PCR)    











Med Orders - Current: 


 Current Medications





Acetaminophen (Tylenol)  650 mg PO Q4H PRN


   PRN Reason: Pain (Mild 1-3)/fever


Acetaminophen (Tylenol)  650 mg PO BID [facility]


   Last Admin: 03/25/19 09:04 Dose:  Not Given


Hydrocodone Bitart/Acetaminophen (Norco 325-5 Mg)  1 tab PO Q4H PRN


   PRN Reason: Pain (moderate 4-6)


Albuterol/Ipratropium (Duoneb 3.0-0.5 Mg/3 Ml)  3 ml NEB Q4H PRN


   PRN Reason: Shortness Of Breath/wheezing


Bisacodyl (Dulcolax)  5 mg PO DAILY PRN


   PRN Reason: Constipation


Carvedilol (Coreg)  25 mg PO BID [facility]


   Last Admin: 03/25/19 09:05 Dose:  25 mg


Citalopram Hydrobromide (Celexa)  10 mg PO DAILY [facility]


   Last Admin: 03/25/19 09:03 Dose:  10 mg


Clopidogrel Bisulfate (Plavix)  75 mg PO DAILY [facility]


   Last Admin: 03/25/19 09:04 Dose:  75 mg


Dextrose/Water (Dextrose 50% In Water)  50 ml IVPUSH ASDIRECTED PRN


   PRN Reason: Hypoglycemia


Diltiazem HCl (Cardizem Cd)  120 mg PO DAILY [facility]


   Last Admin: 03/25/19 08:49 Dose:  120 mg


Docusate Sodium (Colace)  100 mg PO BID PRN


   PRN Reason: Constipation


Furosemide (Lasix)  20 mg PO DAILY@0600 [facility]


   Last Admin: 03/25/19 06:29 Dose:  20 mg


Hydralazine HCl (Apresoline)  10 mg IVPUSH Q4H PRN


   PRN Reason: Hypertension


Hydrocortisone (Hydrocortisone 1% Crm)  0.001 gm TOP BID [facility]


   Last Admin: 03/25/19 09:35 Dose:  Not Given


Hydromorphone HCl (Dilaudid)  0.25 mg IVPUSH Q2H PRN


   PRN Reason: Pain (severe 7-10)


Insulin Glargine (Lantus)  12 unit SUBCUT DAILY@1700 [facility]


Insulin Glargine (Lantus)  40 unit SUBCUT DAILY@0800 [facility]


   Last Admin: 03/25/19 09:05 Dose:  40 units


Insulin Human Lispro (Humalog)  0 unit SUBCUT QIDACANDBED [facility]; Protocol


   Last Admin: 03/25/19 12:28 Dose:  1 units


Levothyroxine Sodium (Levothyroxine)  25 mcg PO DAILY [facility]


   Last Admin: 03/25/19 08:49 Dose:  25 mcg


Lisinopril (Prinivil)  20 mg PO Q12HR [facility]


   Last Admin: 03/25/19 09:04 Dose:  20 mg


Lorazepam (Ativan)  2 mg IVPUSH Q4H PRN


   PRN Reason: Seizures


Lorazepam (Ativan)  0.25 mg IV Q6H PRN


   PRN Reason: Anxiety


Metoprolol Tartrate (Lopressor)  5 mg IVPUSH Q4H PRN


   PRN Reason: Tachycardia


Multivitamins (Thera)  1 each PO DAILY [facility]


   Last Admin: 03/25/19 09:05 Dose:  1 each


Nystatin (Nystatin Crm)  0 gm TOP BID [facility]


   Last Admin: 03/25/19 09:07 Dose:  1 applic


Ondansetron HCl (Zofran)  4 mg IV Q6H PRN


   PRN Reason: Nausea/Vomiting


Pantoprazole Sodium (Protonix***)  40 mg PO DAILY@0700 [facility]


   Last Admin: 03/25/19 06:29 Dose:  40 mg


Polyethylene Glycol (Miralax)  17 gm PO DAILY PRN


   PRN Reason: Constipation


Senna/Docusate Sodium (Senna Plus)  1 tab PO BID PRN


   PRN Reason: Constipation


Simvastatin (Zocor)  20 mg PO BEDTIME [facility]


   Last Admin: 03/24/19 21:26 Dose:  20 mg


Sodium Chloride (Saline Flush)  10 ml FLUSH ASDIRECTED PRN


   PRN Reason: Keep Vein Open


   Last Admin: 03/24/19 13:55 Dose:  10 ml


Spironolactone (Aldactone)  12.5 mg PO 0600 [facility]


   Last Admin: 03/25/19 06:29 Dose:  12.5 mg





Discontinued Medications





Furosemide (Lasix)  20 mg PO DAILY [facility]


Magnesium Sulfate (Pharmacy To Dose - Magnesium Replacement)  0 dose .XX 

ASDIRECTED PRN


   PRN Reason: RX TO WATCH MAG


Ondansetron HCl (Zofran)  4 mg IVPUSH ONETIME ONE


   Stop: 03/24/19 14:08


   Last Admin: 03/24/19 14:10 Dose:  4 mg


Potassium Chloride (Pharmacy To Dose - Potassium Replacement)  0 dose .XX 

ASDIRECTED PRN


   PRN Reason: RX TO WATCH K


Spironolactone (Aldactone)  12.5 mg PO DAILY [facility]











- Exam


Quality Assessment: DVT Prophylaxis


General: Alert, Oriented, Cooperative, No Acute Distress


HEENT: Pupils Equal, Pupils Reactive, EOMI


Neck: Trachea Midline, No JVD


Lungs: Normal Respiratory Effort


Cardiovascular: Regular Rate, Regular Rhythm


GI/Abdominal Exam: Normal Bowel Sounds, Soft, Non-Tender, No Distention


 (Female) Exam: Deferred


Back Exam: Normal Inspection


Extremities: Normal Inspection, Non-Tender, Normal Capillary Refill


Skin: Warm


Neurological: No New Focal Deficit


Psy/Mental Status: Alert, Normal Affect, Normal Mood





- Problem List Review


Problem List Initiated/Reviewed/Updated: Yes





- Plan


Plan:: 


Assessment/Plan:


Acute:


TIA


   - Unclear in etiology


   - Suspect cardio embolic stroke; HR 51 (bradycardia)


   - Risk Factors: HTN, HLD, Carotid Atherosclerosis and DM2 


   - Head CT scan: senescent change. Old jessie holes. Retention cysts, 1.3 cm in 

the sphenoid sinus. No acute intra-cranial abnormality is appreciated. 


   - BS is 118 on presentation


   - UA negative for UTI


   - Also aware at this time treatment is secondary prevention and additional 

tests will be done tomorrow 


   - NIH score is 0


   - Patient already on Plavix, Coreg  and Statin


   - Stroke Protocol: Lipid Panel, Aspiration Precaution, Brain MRI, 2D Echo, 

Carotid U/S, SLP eval, PT/OT and Neuro check BID 





Thrombocytopenia


   - Platelet of 115 (baseline > 200 for 2014 & 2018)


   - She takes Plavix daily


   - The rest of her CBC is wnl





Chronic:


HTN


HLD


MI S/p Stents Placements


S/p CABG


GERD


Back Pain


OA/DJD


Osteoporosis


Carotid Atherosclerosis 


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


DM2


Depression


Obesity Class II





Plan:


Med-Surg with Telemetry


Resume Home Meds


Stroke Protocol


A1C in AM


Brain MRI, 2D echo and Carotid Duplex U/S in AM


PRN Medications


Fall/Aspiration precautions


PT/OT/SLP consult


SW/CM for d/c planning


Code status: 1

## 2019-03-26 VITALS — SYSTOLIC BLOOD PRESSURE: 129 MMHG | DIASTOLIC BLOOD PRESSURE: 44 MMHG

## 2019-03-26 RX ADMIN — NYSTATIN SCH APPLIC: 100000 CREAM TOPICAL at 08:54

## 2019-03-26 RX ADMIN — DILTIAZEM HYDROCHLORIDE SCH MG: 120 CAPSULE, COATED, EXTENDED RELEASE ORAL at 08:58

## 2019-03-26 RX ADMIN — INSULIN GLARGINE SCH UNITS: 100 INJECTION, SOLUTION SUBCUTANEOUS at 09:00

## 2019-03-26 RX ADMIN — THERA TABS SCH EACH: TAB at 08:54

## 2019-03-26 NOTE — [id number]
**Discharge Summary





- Hospital Course


Free Text/Narrative:: 








89 year old female with history of CAD/MI


HPI Initial Comments: 





This is an 88 yo elderly white female with past medical hx/o HTN, HLD, MI S/p 

Stents Placements, S/p CABG, GERD, Back Pain, OA/DJD, Osteoporosis, Carotid 

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 Depression and 

Obesity Class II who comes in for evaluation of a possible stroke. Her daughter 

who went and joined her for lunch, noticed she had trouble speaking and saying 

sensible words. She also reports having a headache. Her last known well was 

about 1200 today. When she presented to ED, she could not speak but able to 

move all her extremities. She was able to get around and stand up. Additionally

, she was not able to answer questions but could follow commands. 





Her initial work up in ED shows a CBC remarkable for MCH of 25.3, MCHC of 30.7, 

Platelet of 115, Monocyte # of 0.66 and ESR of 35. Her Chemistry is significant 

for BS of 118, Albumin of 3.3. Her UA is not suggestive of UTI. Head CT scan 

report reads senescent change. Old jessie holes. Retention cysts, 1.3 cm in the 

sphenoid sinus. No acute intra-cranial abnormality is appreciated. 





Patient is being admitted for further evaluation of TIA. She is DNR/DNI.





Diagnosis: Stroke: Yes


Modified Dickinson Scale: No Symptoms at All


Modified Dickinson Scale Score: 0





- Discharge Data


Discharge Date: 03/26/19


Discharge Disposition: Home, Self-Care 01


Condition: Good





- Patient Summary/Data


Consults: 


 Consultations





03/24/19 18:02


Consult to Case Management/ [CONS] Routine 


Consult to Spiritual Care [CONS] Routine 


OT Evaluation and Treatment [CONS] Routine 


PT Evaluation and Treatment [CONS] Routine 


SLP Evaluation and Treatment [CONS] Routine 














- Patient Instructions


Diet: Usual Diet as Tolerated


Activity: As Tolerated


Driving: Do Not Drive


Showering/Bathing: May Shower


Notify Provider of: Fever, Increased Pain, Nausea and/or Vomiting





- Discharge Plan


*PRESCRIPTION DRUG MONITORING PROGRAM REVIEWED*: Not Applicable


*COPY OF PRESCRIPTION DRUG MONITORING REPORT IN PATIENT ADA: Not Applicable


Prescriptions/Med Rec: 


Aspirin [Ecotrin] 81 mg PO DAILY #30 tab.ec


Spironolactone [Aldactone] 12.5 mg PO DAILY 7 Days  tab


Home Medications: 


 Home Meds





Carvedilol 12.5 mg PO BID 04/01/14 [History]


Citalopram [Citalopram HBr] 10 mg PO DAILY 04/01/14 [History]


Clopidogrel [Plavix] 75 mg PO DAILY 04/01/14 [History]


Insulin Aspart [NovoLOG] 9 units SUBCUT TIDAC 04/01/14 [History]


Insulin Glarg,Human.Rec.Analog [Lantus] 12 units SQ 1700 04/01/14 [History]


Insulin Glarg,Human.Rec.Analog [Lantus] 40 units SQ 0800 04/01/14 [History]


Levothyroxine 25 mcg PO DAILY 04/01/14 [History]


Lisinopril [Zestril] 20 mg PO Q12HR 04/01/14 [History]


Omeprazole 40 mg PO DAILY 04/01/14 [History]


atorvaSTATin [Lipitor] 20 mg PO BEDTIME 04/01/14 [History]


Acetaminophen [Tylenol] 650 mg PO BID 03/24/19 [History]


Diltiazem [Cardizem CD] 120 mg PO DAILY 03/24/19 [History]


Multivitamin [Multivitamins] 1 each PO DAILY 03/24/19 [History]


Nystatin [Nystatin Crm] 1 gm TOP BID PRN 03/24/19 [History]


Aspirin [Ecotrin] 81 mg PO DAILY #30 tab.ec 03/26/19 [Rx]


Furosemide [Lasix] 20 mg PO DAILY@0600  tablet 03/26/19 [Rx]


Spironolactone [Aldactone] 12.5 mg PO DAILY 7 Days  tab 03/26/19 [Rx]








Oxygen Therapy Mode: Room Air


Patient Handouts:  Transient Ischemic Attack, Easy-to-Read


Forms:  ED Department Discharge


Referrals: 


Kade Cm MD [Primary Care Provider] - 04/04/19 1:15 pm (please 

attend the follow up appointment with your primary care provider as scheduled)





- Discharge Summary/Plan Comment


DC Time >30 min.: No


Discharge Summary/Plan Comment: 





Acute:


TIA


   - Unclear in etiology


   - Suspect cardio embolic stroke; HR 51 (bradycardia)


   - Risk Factors: HTN, HLD, Carotid Atherosclerosis and DM2 


   - Head CT scan: senescent change. Old jessie holes. Retention cysts, 1.3 cm in 

the sphenoid sinus. No acute intra-cranial abnormality is appreciated. 


   - BS is 118 on presentation


   - UA negative for UTI


   - Also aware at this time treatment is secondary prevention and additional 

tests will be done tomorrow 


   - NIH score is 0


   - Patient already on Plavix, Coreg  and Statin


   - Stroke Protocol: Lipid Panel, Aspiration Precaution, Brain MRI, 2D Echo, 

Carotid U/S, SLP eval, PT/OT and Neuro check BID 





Thrombocytopenia


   - Platelet of 115 (baseline > 200 for 2014 & 2018)


   - She takes Plavix daily


   - The rest of her CBC is wnl





Chronic:


HTN


HLD


MI S/p Stents Placements


S/p CABG


GERD


Back Pain


OA/DJD


Osteoporosis


Carotid Atherosclerosis 


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


DM2


Depression


Obesity Class II





Plan:


Med-Surg with Telemetry


Resume Home Meds


Stroke Protocol


A1C in AM


Brain MRI, 2D echo and Carotid Duplex U/S in AM


PRN Medications


Fall/Aspiration precautions


PT/OT/SLP consult


SW/CM for d/c planning


Code status: 1








- General Info


Date of Service: 03/24/19


Functional Status: Reports: Pain Controlled, Tolerating Diet, Ambulating, 

Urinating





- Review of Systems


General: Reports: No Symptoms


HEENT: Reports: No Symptoms


Pulmonary: Reports: No Symptoms


Cardiovascular: Reports: No Symptoms


Gastrointestinal: Reports: No Symptoms


Genitourinary: Reports: No Symptoms


Musculoskeletal: Reports: No Symptoms


Skin: Reports: No Symptoms


Neurological: Reports: No Symptoms


Psychiatric: Reports: No Symptoms





- Patient Data


Vitals - Most Recent: 


 Last Vital Signs











Temp  36.6 C   03/26/19 11:45


 


Pulse  55 L  03/26/19 11:45


 


Resp  14   03/26/19 11:45


 


BP  129/44 L  03/26/19 11:45


 


Pulse Ox  92 L  03/26/19 11:45











Weight - Most Recent: 93.44 kg


I&O - Last 24 hours: 


 Intake & Output











 03/25/19 03/26/19 03/26/19





 22:59 06:59 14:59


 


Intake Total 500 400 540


 


Output Total  750 


 


Balance 500 -350 540











Lab Results - Last 24 hrs: 


 Laboratory Results - last 24 hr











  03/25/19 03/25/19 03/26/19 Range/Units





  16:51 21:30 05:18 


 


Sodium    139  (136-145)  mEq/L


 


Potassium    3.7  (3.5-5.1)  mEq/L


 


Chloride    103  ()  mEq/L


 


Carbon Dioxide    30  (21-32)  mEq/L


 


Anion Gap    9.7  (5-15)  


 


BUN    17  (7-18)  mg/dL


 


Creatinine    0.8  (0.55-1.02)  mg/dL


 


Est Cr Clr Drug Dosing    44.63  mL/min


 


Estimated GFR (MDRD)    > 60  (>60)  mL/min


 


BUN/Creatinine Ratio    21.3 H  (14-18)  


 


Glucose    87  ()  mg/dL


 


POC Glucose  140 H  170 H   ()  mg/dL


 


Calcium    8.5  (8.5-10.1)  mg/dL


 


Magnesium    2.1  (1.8-2.4)  mg/dl














  03/26/19 03/26/19 Range/Units





  06:25 11:03 


 


Sodium    (136-145)  mEq/L


 


Potassium    (3.5-5.1)  mEq/L


 


Chloride    ()  mEq/L


 


Carbon Dioxide    (21-32)  mEq/L


 


Anion Gap    (5-15)  


 


BUN    (7-18)  mg/dL


 


Creatinine    (0.55-1.02)  mg/dL


 


Est Cr Clr Drug Dosing    mL/min


 


Estimated GFR (MDRD)    (>60)  mL/min


 


BUN/Creatinine Ratio    (14-18)  


 


Glucose    ()  mg/dL


 


POC Glucose  98  215 H  ()  mg/dL


 


Calcium    (8.5-10.1)  mg/dL


 


Magnesium    (1.8-2.4)  mg/dl











Med Orders - Current: 


 Current Medications





Acetaminophen (Tylenol)  650 mg PO Q4H PRN


   PRN Reason: Pain (Mild 1-3)/fever


Acetaminophen (Tylenol)  650 mg PO BID [facility]


   Last Admin: 03/26/19 08:57 Dose:  650 mg


Hydrocodone Bitart/Acetaminophen (Norco 325-5 Mg)  1 tab PO Q4H PRN


   PRN Reason: Pain (moderate 4-6)


Albuterol/Ipratropium (Duoneb 3.0-0.5 Mg/3 Ml)  3 ml NEB Q4H PRN


   PRN Reason: Shortness Of Breath/wheezing


Aspirin (Ecotrin)  325 mg PO DAILY [facility]


   Last Admin: 03/26/19 12:25 Dose:  325 mg


Bisacodyl (Dulcolax)  5 mg PO DAILY PRN


   PRN Reason: Constipation


Carvedilol (Coreg)  25 mg PO BID [facility]


   Last Admin: 03/26/19 08:55 Dose:  25 mg


Citalopram Hydrobromide (Celexa)  10 mg PO DAILY [facility]


   Last Admin: 03/26/19 08:58 Dose:  10 mg


Clopidogrel Bisulfate (Plavix)  75 mg PO DAILY [facility]


   Last Admin: 03/26/19 08:58 Dose:  75 mg


Dextrose/Water (Dextrose 50% In Water)  50 ml IVPUSH ASDIRECTED PRN


   PRN Reason: Hypoglycemia


Diltiazem HCl (Cardizem Cd)  120 mg PO DAILY [facility]


   Last Admin: 03/26/19 08:58 Dose:  120 mg


Docusate Sodium (Colace)  100 mg PO BID PRN


   PRN Reason: Constipation


Furosemide (Lasix)  20 mg PO DAILY@0600 [facility]


   Last Admin: 03/26/19 06:27 Dose:  20 mg


Hydralazine HCl (Apresoline)  10 mg IVPUSH Q4H PRN


   PRN Reason: Hypertension


Hydrocortisone (Hydrocortisone 1% Crm)  0.001 gm TOP BID [facility]


   Last Admin: 03/26/19 08:54 Dose:  Not Given


Hydromorphone HCl (Dilaudid)  0.25 mg IVPUSH Q2H PRN


   PRN Reason: Pain (severe 7-10)


Insulin Glargine (Lantus)  12 unit SUBCUT DAILY@1700 [facility]


   Last Admin: 03/25/19 17:16 Dose:  12 units


Insulin Glargine (Lantus)  40 unit SUBCUT DAILY@0800 [facility]


   Last Admin: 03/26/19 09:00 Dose:  40 units


Insulin Human Lispro (Humalog)  0 unit SUBCUT QIDACANDBED [facility]; Protocol


   Last Admin: 03/26/19 12:19 Dose:  2 units


Levothyroxine Sodium (Levothyroxine)  25 mcg PO DAILY [facility]


   Last Admin: 03/26/19 08:54 Dose:  25 mcg


Lisinopril (Prinivil)  20 mg PO Q12HR [facility]


   Last Admin: 03/26/19 08:55 Dose:  20 mg


Lorazepam (Ativan)  2 mg IVPUSH Q4H PRN


   PRN Reason: Seizures


Lorazepam (Ativan)  0.25 mg IV Q6H PRN


   PRN Reason: Anxiety


Metoprolol Tartrate (Lopressor)  5 mg IVPUSH Q4H PRN


   PRN Reason: Tachycardia


Multivitamins (Thera)  1 each PO DAILY [facility]


   Last Admin: 03/26/19 08:54 Dose:  1 each


Nystatin (Nystatin Crm)  0 gm TOP BID [facility]


   Last Admin: 03/26/19 08:54 Dose:  1 applic


Ondansetron HCl (Zofran)  4 mg IV Q6H PRN


   PRN Reason: Nausea/Vomiting


Pantoprazole Sodium (Protonix***)  40 mg PO DAILY@0700 [facility]


   Last Admin: 03/26/19 06:27 Dose:  40 mg


Polyethylene Glycol (Miralax)  17 gm PO DAILY PRN


   PRN Reason: Constipation


Senna/Docusate Sodium (Senna Plus)  1 tab PO BID PRN


   PRN Reason: Constipation


Simvastatin (Zocor)  20 mg PO BEDTIME [facility]


   Last Admin: 03/25/19 20:21 Dose:  20 mg


Sodium Chloride (Saline Flush)  10 ml FLUSH ASDIRECTED PRN


   PRN Reason: Keep Vein Open


   Last Admin: 03/24/19 13:55 Dose:  10 ml


Spironolactone (Aldactone)  12.5 mg PO 0600 [facility]


   Last Admin: 03/26/19 06:27 Dose:  12.5 mg





Discontinued Medications





Furosemide (Lasix)  20 mg PO DAILY [facility]


Magnesium Sulfate (Pharmacy To Dose - Magnesium Replacement)  0 dose .XX 

ASDIRECTED PRN


   PRN Reason: RX TO WATCH MAG


Ondansetron HCl (Zofran)  4 mg IVPUSH ONETIME ONE


   Stop: 03/24/19 14:08


   Last Admin: 03/24/19 14:10 Dose:  4 mg


Potassium Chloride (Pharmacy To Dose - Potassium Replacement)  0 dose .XX 

ASDIRECTED PRN


   PRN Reason: RX TO WATCH K


Spironolactone (Aldactone)  12.5 mg PO DAILY [facility]











- Exam


Quality Assessment: Reports: DVT Prophylaxis


General: Reports: Alert, Oriented, Cooperative, No Acute Distress


HEENT: Reports: Pupils Equal, Pupils Reactive, EOMI


Neck: Reports: Trachea Midline, No JVD


Lungs: Reports: Normal Respiratory Effort


Cardiovascular: Reports: Regular Rate


GI/Abdominal Exam: Normal Bowel Sounds, Soft, Non-Tender, No Organomegaly, No 

Distention


 (Female) Exam: Deferred


Rectal (Female) Exam: Deferred


Back Exam: Reports: Normal Inspection


Extremities: Normal Inspection, Non-Tender, Normal Capillary Refill


Skin: Reports: Warm


Neurological: Reports: No New Focal Deficit


Psy/Mental Status: Reports: Alert, Normal Affect, Normal Mood